# Patient Record
Sex: MALE | Race: ASIAN | URBAN - METROPOLITAN AREA
[De-identification: names, ages, dates, MRNs, and addresses within clinical notes are randomized per-mention and may not be internally consistent; named-entity substitution may affect disease eponyms.]

---

## 2017-09-08 ENCOUNTER — HOSPITAL ENCOUNTER (EMERGENCY)
Facility: HOSPITAL | Age: 57
Discharge: HOME | End: 2017-09-08
Attending: EMERGENCY MEDICINE
Payer: COMMERCIAL

## 2017-09-08 VITALS
WEIGHT: 172 LBS | SYSTOLIC BLOOD PRESSURE: 156 MMHG | TEMPERATURE: 98 F | HEART RATE: 88 BPM | DIASTOLIC BLOOD PRESSURE: 84 MMHG | RESPIRATION RATE: 18 BRPM

## 2017-09-08 DIAGNOSIS — H81.10 BENIGN PAROXYSMAL POSITIONAL VERTIGO, UNSPECIFIED LATERALITY: Primary | ICD-10-CM

## 2017-09-08 PROCEDURE — 99283 EMERGENCY DEPT VISIT LOW MDM: CPT

## 2017-09-08 RX ORDER — SIMVASTATIN 10 MG
10 TABLET ORAL NIGHTLY
COMMUNITY
End: 2021-06-04

## 2017-09-08 RX ORDER — MECLIZINE HYDROCHLORIDE 25 MG/1
TABLET ORAL 3 TIMES DAILY PRN
Qty: 30 TABLET | Refills: 1 | Status: SHIPPED | OUTPATIENT
Start: 2017-09-08 | End: 2019-09-17

## 2017-09-08 RX ORDER — BENAZEPRIL HYDROCHLORIDE 20 MG/1
20 TABLET ORAL DAILY
COMMUNITY
End: 2021-06-04

## 2017-09-08 RX ORDER — MECLIZINE HYDROCHLORIDE 25 MG/1
25 TABLET ORAL ONCE
Status: COMPLETED | OUTPATIENT
Start: 2017-09-08 | End: 2017-09-08

## 2017-09-08 RX ORDER — ONDANSETRON 4 MG/1
4 TABLET, ORALLY DISINTEGRATING ORAL EVERY 4 HOURS PRN
Qty: 10 TABLET | Refills: 0 | Status: SHIPPED | OUTPATIENT
Start: 2017-09-08 | End: 2017-09-15

## 2017-09-08 RX ORDER — ONDANSETRON 4 MG/1
4 TABLET, ORALLY DISINTEGRATING ORAL ONCE
Status: COMPLETED | OUTPATIENT
Start: 2017-09-08 | End: 2017-09-08

## 2017-09-08 RX ORDER — LANSOPRAZOLE 30 MG/1
30 CAPSULE, DELAYED RELEASE ORAL
COMMUNITY

## 2017-09-08 NOTE — ED PROVIDER NOTES
Patient Seen in: [facility] Emergency Department    History   Patient presents with:  Nausea/Vomiting/Diarrhea (gastrointestinal)  Dizziness (neurologic)    Stated Complaint: vomiting/dizziness    HPI    Patient was feeling fine today.   He ate a lar masses, no organomegaly, no rebound, no guarding   Extremities: No cyanosis, no clubbing, no edema   Musculoskeletal: No tenderness, swelling, deformity   Skin: No significant lesions   Neuro: Grossly intact to patient's baseline, cranial nerves II through

## 2017-09-08 NOTE — ED NOTES
Pt denies any nausea or pain at this time pt states he is still dizzy  With movement of his head otherwise ok po challenge given and tolerated

## 2017-12-27 ENCOUNTER — APPOINTMENT (OUTPATIENT)
Dept: GENERAL RADIOLOGY | Facility: HOSPITAL | Age: 57
End: 2017-12-27
Attending: EMERGENCY MEDICINE
Payer: COMMERCIAL

## 2017-12-27 ENCOUNTER — HOSPITAL ENCOUNTER (EMERGENCY)
Facility: HOSPITAL | Age: 57
Discharge: HOME | End: 2017-12-27
Attending: EMERGENCY MEDICINE
Payer: COMMERCIAL

## 2017-12-27 VITALS
SYSTOLIC BLOOD PRESSURE: 128 MMHG | DIASTOLIC BLOOD PRESSURE: 83 MMHG | RESPIRATION RATE: 16 BRPM | OXYGEN SATURATION: 96 % | TEMPERATURE: 99 F | WEIGHT: 172 LBS | HEART RATE: 88 BPM

## 2017-12-27 DIAGNOSIS — J06.9 UPPER RESPIRATORY TRACT INFECTION, UNSPECIFIED TYPE: Primary | ICD-10-CM

## 2017-12-27 DIAGNOSIS — J02.9 VIRAL PHARYNGITIS: ICD-10-CM

## 2017-12-27 PROCEDURE — 87081 CULTURE SCREEN ONLY: CPT | Performed by: EMERGENCY MEDICINE

## 2017-12-27 PROCEDURE — 99283 EMERGENCY DEPT VISIT LOW MDM: CPT

## 2017-12-27 PROCEDURE — 71020 XR CHEST PA + LAT CHEST (CPT=71020): CPT | Performed by: EMERGENCY MEDICINE

## 2017-12-27 PROCEDURE — 87430 STREP A AG IA: CPT | Performed by: EMERGENCY MEDICINE

## 2017-12-27 RX ORDER — OSELTAMIVIR PHOSPHATE 75 MG/1
75 CAPSULE ORAL 2 TIMES DAILY
Qty: 10 CAPSULE | Refills: 0 | Status: SHIPPED | OUTPATIENT
Start: 2017-12-27 | End: 2018-01-01

## 2017-12-27 RX ORDER — FENOFIBRATE 145 MG/1
145 TABLET, COATED ORAL DAILY
COMMUNITY

## 2017-12-27 NOTE — ED NOTES
PT LEFT ANGRY THAT WE CAN'T FIX HIS DRY SCRATCHY THROAT THAT HAS KEPT HIM FROM SLEEPING ALL NIGHT. I TOLD HIM TO TAKE TYLENOL OR MOTRIN FOR PAIN AND HE SAID HE DOESN'T HAVE PAIN.

## 2017-12-27 NOTE — ED PROVIDER NOTES
Patient Seen in: [facility] Emergency Department    History   Patient presents with:  Sore Throat    Stated Complaint: sore throat    HPI    Patient is a 61-year-old male presents emergency room with history of sore throat and cough which is been on focal tenderness to palpation appreciated. There is no JVD. No meningeal signs or nuchal rigidity appreciated. No stridor. LUNGS: Clear to auscultation bilaterally with no wheeze. There is good equal air entry bilaterally. HEART: Regular rate and rhythm. discharged home at this time.             Disposition and Plan     Clinical Impression:  Upper respiratory tract infection, unspecified type  (primary encounter diagnosis)  Viral pharyngitis    Disposition:  Discharge  12/27/2017  6:35 am    Follow-up:  Opa

## 2017-12-27 NOTE — ED INITIAL ASSESSMENT (HPI)
Patient is a 60 yo male with c/o sore throat and cough over the last 24 hours. Patient is afebrile. Patient states that cough is non-productive.

## 2019-09-17 ENCOUNTER — OFFICE VISIT (OUTPATIENT)
Dept: SURGERY | Facility: CLINIC | Age: 59
End: 2019-09-17
Payer: COMMERCIAL

## 2019-09-17 VITALS
HEIGHT: 70 IN | DIASTOLIC BLOOD PRESSURE: 72 MMHG | WEIGHT: 172 LBS | SYSTOLIC BLOOD PRESSURE: 138 MMHG | TEMPERATURE: 98 F | BODY MASS INDEX: 24.62 KG/M2

## 2019-09-17 DIAGNOSIS — K64.2 GRADE III INTERNAL HEMORRHOIDS: Primary | ICD-10-CM

## 2019-09-17 DIAGNOSIS — Z86.010 HX OF COLONIC POLYPS: ICD-10-CM

## 2019-09-17 PROBLEM — Z86.0100 HX OF COLONIC POLYPS: Status: ACTIVE | Noted: 2019-09-17

## 2019-09-17 PROCEDURE — 99203 OFFICE O/P NEW LOW 30 MIN: CPT | Performed by: COLON & RECTAL SURGERY

## 2019-09-17 PROCEDURE — 46600 DIAGNOSTIC ANOSCOPY SPX: CPT | Performed by: COLON & RECTAL SURGERY

## 2019-09-17 RX ORDER — POLYETHYLENE GLYCOL 3350, SODIUM CHLORIDE, SODIUM BICARBONATE, POTASSIUM CHLORIDE 420; 11.2; 5.72; 1.48 G/4L; G/4L; G/4L; G/4L
POWDER, FOR SOLUTION ORAL
Qty: 1 BOTTLE | Refills: 0 | Status: SHIPPED | OUTPATIENT
Start: 2019-09-17 | End: 2021-06-04

## 2019-09-17 NOTE — H&P
New Patient Visit Note       Active Problems      1. Grade III internal hemorrhoids    2. Hx of colonic polyps        Chief Complaint   Patient presents with:  Hemorrhoids: pt here for hemorrhoid consult, pt would like banding tx.        History of Present making. Donita Mark PA-C    I agree with the note as scribed by the PA  I performed my own physical exam and obtained the history as detailed above.   I have made all appropriate changes in documentation as necessary  I attest to the accuracy of thi Oral Tab, Take 20 mg by mouth daily. , Disp: , Rfl:   •  simvastatin 10 MG Oral Tab, Take 10 mg by mouth nightly., Disp: , Rfl:   •  lansoprazole 30 MG Oral Capsule Delayed Release, Take 30 mg by mouth every morning before breakfast., Disp: , Rfl:       Rev Abdominal: Soft. Normal appearance and bowel sounds are normal. He exhibits no distension, no fluid wave, no ascites, no pulsatile midline mass and no mass. There is no splenomegaly or hepatomegaly. There is no tenderness.  There is no rigidity, no reboun Nursing note and vitals reviewed. Assessment   Grade III internal hemorrhoids  (primary encounter diagnosis)  Hx of colonic polyps      Plan   Nonah Pencil presents today in consultation of Dr. Miguel Steele for possible hemorrhoids.     The patient sta of the rectum including anoscopy reveals no external hemorrhoids or fissures. There are prolapsing grade 3 internal hemorrhoids circumferentially. There are no palpable masses. Prostate is of normal size.   There is no evidence of any Crohn's disease or

## 2019-09-17 NOTE — PROCEDURES
Pre op diagnosis: Internal Hemorrhoids    Post op diagnosis: Same    Procedure: Anoscopy with O-ring Rubber Band Ligation of Internal Hemorrhoids    Surgeon: Erika Mendez MD    History of present illness:    This patient has internal hemorrhoids that are s

## 2019-09-17 NOTE — PATIENT INSTRUCTIONS
Jhoana Sweeney presents today in consultation of Dr. Vladislav Wood for possible hemorrhoids. The patient states that for multiple years now he has been suffering from hemorrhoids.   He states that every time he has a bowel movement he feels the hemorrhoids prolap There are no palpable masses. Prostate is of normal size. There is no evidence of any Crohn's disease or proctitis. The patient does have grade 3 internal hemorrhoids. I discussed with the patient that I recommend rubberbanding's at this time.   The p

## 2019-09-27 PROBLEM — M65.342 TRIGGER FINGER, LEFT RING FINGER: Status: ACTIVE | Noted: 2019-09-27

## 2019-10-24 ENCOUNTER — PATIENT OUTREACH (OUTPATIENT)
Dept: SURGERY | Facility: CLINIC | Age: 59
End: 2019-10-24

## 2021-01-07 PROCEDURE — 88305 TISSUE EXAM BY PATHOLOGIST: CPT | Performed by: UROLOGY

## 2021-01-07 PROCEDURE — 88344 IMHCHEM/IMCYTCHM EA MLT ANTB: CPT | Performed by: UROLOGY

## 2021-04-08 PROBLEM — H60.543 ECZEMA OF EXTERNAL EAR, BILATERAL: Status: ACTIVE | Noted: 2021-04-08

## 2021-04-08 PROBLEM — H93.299 ABNORMAL AUDITORY PERCEPTION, UNSPECIFIED LATERALITY: Status: ACTIVE | Noted: 2021-04-08

## 2021-06-04 ENCOUNTER — OFFICE VISIT (OUTPATIENT)
Dept: FAMILY MEDICINE CLINIC | Facility: CLINIC | Age: 61
End: 2021-06-04
Payer: COMMERCIAL

## 2021-06-04 ENCOUNTER — LAB ENCOUNTER (OUTPATIENT)
Dept: LAB | Age: 61
End: 2021-06-04
Attending: FAMILY MEDICINE
Payer: COMMERCIAL

## 2021-06-04 VITALS
HEART RATE: 66 BPM | RESPIRATION RATE: 20 BRPM | HEIGHT: 69 IN | OXYGEN SATURATION: 98 % | DIASTOLIC BLOOD PRESSURE: 80 MMHG | SYSTOLIC BLOOD PRESSURE: 144 MMHG | WEIGHT: 171 LBS | BODY MASS INDEX: 25.33 KG/M2 | TEMPERATURE: 98 F

## 2021-06-04 DIAGNOSIS — E78.2 ELEVATED TRIGLYCERIDES WITH HIGH CHOLESTEROL: ICD-10-CM

## 2021-06-04 DIAGNOSIS — R97.20 ELEVATED PSA: ICD-10-CM

## 2021-06-04 DIAGNOSIS — L20.9 ATOPIC DERMATITIS, UNSPECIFIED TYPE: ICD-10-CM

## 2021-06-04 DIAGNOSIS — L20.9 ATOPIC DERMATITIS, UNSPECIFIED TYPE: Primary | ICD-10-CM

## 2021-06-04 PROCEDURE — 86003 ALLG SPEC IGE CRUDE XTRC EA: CPT

## 2021-06-04 PROCEDURE — 3077F SYST BP >= 140 MM HG: CPT | Performed by: FAMILY MEDICINE

## 2021-06-04 PROCEDURE — 86140 C-REACTIVE PROTEIN: CPT

## 2021-06-04 PROCEDURE — 85652 RBC SED RATE AUTOMATED: CPT

## 2021-06-04 PROCEDURE — 99204 OFFICE O/P NEW MOD 45 MIN: CPT | Performed by: FAMILY MEDICINE

## 2021-06-04 PROCEDURE — 3008F BODY MASS INDEX DOCD: CPT | Performed by: FAMILY MEDICINE

## 2021-06-04 PROCEDURE — 3079F DIAST BP 80-89 MM HG: CPT | Performed by: FAMILY MEDICINE

## 2021-06-04 PROCEDURE — 36415 COLL VENOUS BLD VENIPUNCTURE: CPT

## 2021-06-04 PROCEDURE — 82785 ASSAY OF IGE: CPT

## 2021-06-04 PROCEDURE — 84153 ASSAY OF PSA TOTAL: CPT

## 2021-06-04 RX ORDER — AMLODIPINE BESYLATE AND BENAZEPRIL HYDROCHLORIDE 10; 40 MG/1; MG/1
CAPSULE ORAL
COMMUNITY
Start: 2020-09-21

## 2021-06-04 RX ORDER — ATORVASTATIN CALCIUM 40 MG/1
TABLET, FILM COATED ORAL
COMMUNITY
Start: 2021-04-05

## 2021-06-04 RX ORDER — MECLIZINE HYDROCHLORIDE 25 MG/1
TABLET ORAL
COMMUNITY
Start: 2021-02-18

## 2021-06-04 RX ORDER — ICOSAPENT ETHYL 1000 MG/1
2 CAPSULE ORAL 2 TIMES DAILY
Qty: 120 CAPSULE | Refills: 11 | Status: SHIPPED | OUTPATIENT
Start: 2021-06-04

## 2021-06-04 RX ORDER — TAMSULOSIN HYDROCHLORIDE 0.4 MG/1
CAPSULE ORAL DAILY
COMMUNITY

## 2021-06-04 NOTE — PROGRESS NOTES
HPI/Subjective:   Patient ID: Topher Mason is a 56year old male. Rash  This is a chronic problem. Episode onset: 1 year ago. The problem has been waxing and waning since onset. The rash is diffuse. The rash is characterized by itchiness and redness.  He needed 45 g 2   • Fenofibrate 145 MG Oral Tab Take 145 mg by mouth daily.      • lansoprazole 30 MG Oral Capsule Delayed Release Take 30 mg by mouth every morning before breakfast.       Allergies:No Known Allergies    Objective:   Physical Exam  Vitals rev 11      Orders Placed This Encounter      Adult Food Allergy Prof [E]      Allergens, Zone 8 [E]      Sed Rate, Gaudencioren (Automated) [E]      C-Reactive Protein [E]      Meds This Visit:  Requested Prescriptions     Signed Prescriptions Disp Refills   •

## 2022-04-20 ENCOUNTER — OFFICE VISIT (OUTPATIENT)
Dept: SURGERY | Facility: CLINIC | Age: 62
End: 2022-04-20
Payer: COMMERCIAL

## 2022-04-20 VITALS
HEART RATE: 48 BPM | SYSTOLIC BLOOD PRESSURE: 162 MMHG | WEIGHT: 160.63 LBS | HEIGHT: 70 IN | DIASTOLIC BLOOD PRESSURE: 75 MMHG | TEMPERATURE: 97 F | BODY MASS INDEX: 23 KG/M2

## 2022-04-20 DIAGNOSIS — K40.20 NON-RECURRENT BILATERAL INGUINAL HERNIA WITHOUT OBSTRUCTION OR GANGRENE: Primary | ICD-10-CM

## 2022-04-20 PROCEDURE — 3008F BODY MASS INDEX DOCD: CPT | Performed by: SURGERY

## 2022-04-20 PROCEDURE — 3077F SYST BP >= 140 MM HG: CPT | Performed by: SURGERY

## 2022-04-20 PROCEDURE — 99214 OFFICE O/P EST MOD 30 MIN: CPT | Performed by: SURGERY

## 2022-04-20 PROCEDURE — 3078F DIAST BP <80 MM HG: CPT | Performed by: SURGERY

## 2022-04-22 ENCOUNTER — TELEPHONE (OUTPATIENT)
Dept: SURGERY | Facility: CLINIC | Age: 62
End: 2022-04-22

## 2022-04-29 ENCOUNTER — EKG ENCOUNTER (OUTPATIENT)
Dept: LAB | Facility: HOSPITAL | Age: 62
End: 2022-04-29
Attending: SURGERY
Payer: COMMERCIAL

## 2022-04-29 ENCOUNTER — LAB ENCOUNTER (OUTPATIENT)
Dept: LAB | Facility: HOSPITAL | Age: 62
End: 2022-04-29
Attending: SURGERY
Payer: COMMERCIAL

## 2022-04-29 ENCOUNTER — LABORATORY ENCOUNTER (OUTPATIENT)
Dept: LAB | Facility: HOSPITAL | Age: 62
End: 2022-04-29
Attending: SURGERY
Payer: COMMERCIAL

## 2022-04-29 DIAGNOSIS — Z01.812 ENCOUNTER FOR PREOPERATIVE SCREENING LABORATORY TESTING FOR COVID-19 VIRUS: ICD-10-CM

## 2022-04-29 DIAGNOSIS — Z20.822 ENCOUNTER FOR PREOPERATIVE SCREENING LABORATORY TESTING FOR COVID-19 VIRUS: ICD-10-CM

## 2022-04-29 LAB
ANION GAP SERPL CALC-SCNC: 6 MMOL/L (ref 0–18)
ATRIAL RATE: 54 BPM
BUN BLD-MCNC: 9 MG/DL (ref 7–18)
CALCIUM BLD-MCNC: 9.5 MG/DL (ref 8.5–10.1)
CHLORIDE SERPL-SCNC: 107 MMOL/L (ref 98–112)
CO2 SERPL-SCNC: 26 MMOL/L (ref 21–32)
CREAT BLD-MCNC: 0.93 MG/DL
FASTING STATUS PATIENT QL REPORTED: YES
GLUCOSE BLD-MCNC: 111 MG/DL (ref 70–99)
OSMOLALITY SERPL CALC.SUM OF ELEC: 287 MOSM/KG (ref 275–295)
P AXIS: 57 DEGREES
P-R INTERVAL: 132 MS
POTASSIUM SERPL-SCNC: 4.1 MMOL/L (ref 3.5–5.1)
Q-T INTERVAL: 442 MS
QRS DURATION: 92 MS
QTC CALCULATION (BEZET): 419 MS
R AXIS: 4 DEGREES
SODIUM SERPL-SCNC: 139 MMOL/L (ref 136–145)
T AXIS: 46 DEGREES
VENTRICULAR RATE: 54 BPM

## 2022-04-29 PROCEDURE — 93010 ELECTROCARDIOGRAM REPORT: CPT | Performed by: INTERNAL MEDICINE

## 2022-04-29 PROCEDURE — 80048 BASIC METABOLIC PNL TOTAL CA: CPT

## 2022-04-29 PROCEDURE — 36415 COLL VENOUS BLD VENIPUNCTURE: CPT

## 2022-04-29 PROCEDURE — 93005 ELECTROCARDIOGRAM TRACING: CPT

## 2022-04-30 LAB — SARS-COV-2 RNA RESP QL NAA+PROBE: NOT DETECTED

## 2022-05-02 ENCOUNTER — ANESTHESIA EVENT (OUTPATIENT)
Dept: SURGERY | Facility: HOSPITAL | Age: 62
End: 2022-05-02
Payer: COMMERCIAL

## 2022-05-02 ENCOUNTER — HOSPITAL ENCOUNTER (OUTPATIENT)
Facility: HOSPITAL | Age: 62
Setting detail: HOSPITAL OUTPATIENT SURGERY
Discharge: HOME | End: 2022-05-02
Attending: SURGERY | Admitting: SURGERY
Payer: COMMERCIAL

## 2022-05-02 ENCOUNTER — ANESTHESIA (OUTPATIENT)
Dept: SURGERY | Facility: HOSPITAL | Age: 62
End: 2022-05-02
Payer: COMMERCIAL

## 2022-05-02 VITALS
DIASTOLIC BLOOD PRESSURE: 67 MMHG | HEIGHT: 70 IN | HEART RATE: 54 BPM | TEMPERATURE: 98 F | WEIGHT: 158.94 LBS | OXYGEN SATURATION: 97 % | SYSTOLIC BLOOD PRESSURE: 132 MMHG | BODY MASS INDEX: 22.75 KG/M2 | RESPIRATION RATE: 16 BRPM

## 2022-05-02 DIAGNOSIS — K40.20 NON-RECURRENT BILATERAL INGUINAL HERNIA WITHOUT OBSTRUCTION OR GANGRENE: ICD-10-CM

## 2022-05-02 DIAGNOSIS — Z01.812 ENCOUNTER FOR PREOPERATIVE SCREENING LABORATORY TESTING FOR COVID-19 VIRUS: Primary | ICD-10-CM

## 2022-05-02 DIAGNOSIS — Z20.822 ENCOUNTER FOR PREOPERATIVE SCREENING LABORATORY TESTING FOR COVID-19 VIRUS: Primary | ICD-10-CM

## 2022-05-02 PROCEDURE — 0YUA4JZ SUPPLEMENT BILATERAL INGUINAL REGION WITH SYNTHETIC SUBSTITUTE, PERCUTANEOUS ENDOSCOPIC APPROACH: ICD-10-PCS | Performed by: SURGERY

## 2022-05-02 DEVICE — BARD MESH
Type: IMPLANTABLE DEVICE | Site: INGUINAL | Status: FUNCTIONAL
Brand: BARD MESH

## 2022-05-02 RX ORDER — MEPERIDINE HYDROCHLORIDE 25 MG/ML
12.5 INJECTION INTRAMUSCULAR; INTRAVENOUS; SUBCUTANEOUS AS NEEDED
Status: DISCONTINUED | OUTPATIENT
Start: 2022-05-02 | End: 2022-05-02

## 2022-05-02 RX ORDER — METOCLOPRAMIDE HYDROCHLORIDE 5 MG/ML
10 INJECTION INTRAMUSCULAR; INTRAVENOUS AS NEEDED
Status: DISCONTINUED | OUTPATIENT
Start: 2022-05-02 | End: 2022-05-02

## 2022-05-02 RX ORDER — ACETAMINOPHEN 500 MG
1000 TABLET ORAL ONCE
Status: DISCONTINUED | OUTPATIENT
Start: 2022-05-02 | End: 2022-05-02 | Stop reason: HOSPADM

## 2022-05-02 RX ORDER — HEPARIN SODIUM 5000 [USP'U]/ML
INJECTION, SOLUTION INTRAVENOUS; SUBCUTANEOUS
Status: COMPLETED
Start: 2022-05-02 | End: 2022-05-02

## 2022-05-02 RX ORDER — SODIUM CHLORIDE, SODIUM LACTATE, POTASSIUM CHLORIDE, CALCIUM CHLORIDE 600; 310; 30; 20 MG/100ML; MG/100ML; MG/100ML; MG/100ML
INJECTION, SOLUTION INTRAVENOUS CONTINUOUS
Status: DISCONTINUED | OUTPATIENT
Start: 2022-05-02 | End: 2022-05-02

## 2022-05-02 RX ORDER — ONDANSETRON 2 MG/ML
4 INJECTION INTRAMUSCULAR; INTRAVENOUS AS NEEDED
Status: DISCONTINUED | OUTPATIENT
Start: 2022-05-02 | End: 2022-05-02

## 2022-05-02 RX ORDER — HYDROMORPHONE HYDROCHLORIDE 1 MG/ML
0.4 INJECTION, SOLUTION INTRAMUSCULAR; INTRAVENOUS; SUBCUTANEOUS EVERY 5 MIN PRN
Status: DISCONTINUED | OUTPATIENT
Start: 2022-05-02 | End: 2022-05-02

## 2022-05-02 RX ORDER — HYDROCODONE BITARTRATE AND ACETAMINOPHEN 5; 325 MG/1; MG/1
2 TABLET ORAL AS NEEDED
Status: DISCONTINUED | OUTPATIENT
Start: 2022-05-02 | End: 2022-05-02

## 2022-05-02 RX ORDER — KETOROLAC TROMETHAMINE 30 MG/ML
INJECTION, SOLUTION INTRAMUSCULAR; INTRAVENOUS
Status: COMPLETED
Start: 2022-05-02 | End: 2022-05-02

## 2022-05-02 RX ORDER — HEPARIN SODIUM 5000 [USP'U]/ML
5000 INJECTION, SOLUTION INTRAVENOUS; SUBCUTANEOUS ONCE
Status: COMPLETED | OUTPATIENT
Start: 2022-05-02 | End: 2022-05-02

## 2022-05-02 RX ORDER — LIDOCAINE HYDROCHLORIDE 10 MG/ML
INJECTION, SOLUTION EPIDURAL; INFILTRATION; INTRACAUDAL; PERINEURAL AS NEEDED
Status: DISCONTINUED | OUTPATIENT
Start: 2022-05-02 | End: 2022-05-02 | Stop reason: SURG

## 2022-05-02 RX ORDER — CEFAZOLIN SODIUM/WATER 2 G/20 ML
SYRINGE (ML) INTRAVENOUS
Status: DISCONTINUED
Start: 2022-05-02 | End: 2022-05-02

## 2022-05-02 RX ORDER — KETOROLAC TROMETHAMINE 30 MG/ML
30 INJECTION, SOLUTION INTRAMUSCULAR; INTRAVENOUS ONCE
Status: COMPLETED | OUTPATIENT
Start: 2022-05-02 | End: 2022-05-02

## 2022-05-02 RX ORDER — DEXAMETHASONE SODIUM PHOSPHATE 4 MG/ML
VIAL (ML) INJECTION AS NEEDED
Status: DISCONTINUED | OUTPATIENT
Start: 2022-05-02 | End: 2022-05-02 | Stop reason: SURG

## 2022-05-02 RX ORDER — HYDROCODONE BITARTRATE AND ACETAMINOPHEN 5; 325 MG/1; MG/1
1 TABLET ORAL EVERY 6 HOURS PRN
Qty: 20 TABLET | Refills: 0 | Status: SHIPPED | OUTPATIENT
Start: 2022-05-02 | End: 2022-05-09

## 2022-05-02 RX ORDER — NALOXONE HYDROCHLORIDE 0.4 MG/ML
80 INJECTION, SOLUTION INTRAMUSCULAR; INTRAVENOUS; SUBCUTANEOUS AS NEEDED
Status: DISCONTINUED | OUTPATIENT
Start: 2022-05-02 | End: 2022-05-02

## 2022-05-02 RX ORDER — BUPIVACAINE HYDROCHLORIDE AND EPINEPHRINE 5; 5 MG/ML; UG/ML
INJECTION, SOLUTION EPIDURAL; INTRACAUDAL; PERINEURAL AS NEEDED
Status: DISCONTINUED | OUTPATIENT
Start: 2022-05-02 | End: 2022-05-02 | Stop reason: HOSPADM

## 2022-05-02 RX ORDER — NEOSTIGMINE METHYLSULFATE 1 MG/ML
INJECTION INTRAVENOUS AS NEEDED
Status: DISCONTINUED | OUTPATIENT
Start: 2022-05-02 | End: 2022-05-02 | Stop reason: SURG

## 2022-05-02 RX ORDER — GLYCOPYRROLATE 0.2 MG/ML
INJECTION, SOLUTION INTRAMUSCULAR; INTRAVENOUS AS NEEDED
Status: DISCONTINUED | OUTPATIENT
Start: 2022-05-02 | End: 2022-05-02 | Stop reason: SURG

## 2022-05-02 RX ORDER — CEFAZOLIN SODIUM/WATER 2 G/20 ML
2 SYRINGE (ML) INTRAVENOUS ONCE
Status: COMPLETED | OUTPATIENT
Start: 2022-05-02 | End: 2022-05-02

## 2022-05-02 RX ORDER — ROCURONIUM BROMIDE 10 MG/ML
INJECTION, SOLUTION INTRAVENOUS AS NEEDED
Status: DISCONTINUED | OUTPATIENT
Start: 2022-05-02 | End: 2022-05-02 | Stop reason: SURG

## 2022-05-02 RX ORDER — HYDROCODONE BITARTRATE AND ACETAMINOPHEN 5; 325 MG/1; MG/1
1 TABLET ORAL AS NEEDED
Status: DISCONTINUED | OUTPATIENT
Start: 2022-05-02 | End: 2022-05-02

## 2022-05-02 RX ORDER — HYDROMORPHONE HYDROCHLORIDE 1 MG/ML
INJECTION, SOLUTION INTRAMUSCULAR; INTRAVENOUS; SUBCUTANEOUS
Status: COMPLETED
Start: 2022-05-02 | End: 2022-05-02

## 2022-05-02 RX ORDER — ONDANSETRON 2 MG/ML
INJECTION INTRAMUSCULAR; INTRAVENOUS AS NEEDED
Status: DISCONTINUED | OUTPATIENT
Start: 2022-05-02 | End: 2022-05-02 | Stop reason: SURG

## 2022-05-02 RX ADMIN — ROCURONIUM BROMIDE 40 MG: 10 INJECTION, SOLUTION INTRAVENOUS at 10:36:00

## 2022-05-02 RX ADMIN — GLYCOPYRROLATE 0.6 MG: 0.2 INJECTION, SOLUTION INTRAMUSCULAR; INTRAVENOUS at 11:38:00

## 2022-05-02 RX ADMIN — SODIUM CHLORIDE, SODIUM LACTATE, POTASSIUM CHLORIDE, CALCIUM CHLORIDE: 600; 310; 30; 20 INJECTION, SOLUTION INTRAVENOUS at 11:38:00

## 2022-05-02 RX ADMIN — SODIUM CHLORIDE, SODIUM LACTATE, POTASSIUM CHLORIDE, CALCIUM CHLORIDE: 600; 310; 30; 20 INJECTION, SOLUTION INTRAVENOUS at 11:53:00

## 2022-05-02 RX ADMIN — LIDOCAINE HYDROCHLORIDE 50 MG: 10 INJECTION, SOLUTION EPIDURAL; INFILTRATION; INTRACAUDAL; PERINEURAL at 10:36:00

## 2022-05-02 RX ADMIN — NEOSTIGMINE METHYLSULFATE 3 MG: 1 INJECTION INTRAVENOUS at 11:38:00

## 2022-05-02 RX ADMIN — DEXAMETHASONE SODIUM PHOSPHATE 8 MG: 4 MG/ML VIAL (ML) INJECTION at 10:50:00

## 2022-05-02 RX ADMIN — ONDANSETRON 4 MG: 2 INJECTION INTRAMUSCULAR; INTRAVENOUS at 10:50:00

## 2022-05-02 RX ADMIN — CEFAZOLIN SODIUM/WATER 2 G: 2 G/20 ML SYRINGE (ML) INTRAVENOUS at 10:42:00

## 2022-05-02 RX ADMIN — SODIUM CHLORIDE, SODIUM LACTATE, POTASSIUM CHLORIDE, CALCIUM CHLORIDE: 600; 310; 30; 20 INJECTION, SOLUTION INTRAVENOUS at 10:32:00

## 2022-05-02 NOTE — OPERATIVE REPORT
[facility]  Op Note    Escobar Erwin Location: OR   CSN [id number] MRN [id number]   Admission Date 5/2/2022 Operation Date 5/2/2022   Attending Physician Michael Nunes MD Operating Physician Mirtha Huertas MD   DATE OF OPERATION:  5/2/2022  PREOPERATIVE DIAGNOSIS:  Bilateral inguinal hernia. POSTOPERATIVE DIAGNOSIS:  Bilateral inguinal hernia. PROCEDURE PERFORMED: Laparoscopic bilateral inguinal hernia repair with mesh  SURGEON:  Efraín Meade M.D.  ASSISTANT: Jennifer Muñoz PA-C (Ms. Baird assisted with retraction of the peritoneum and placement of the mesh.)  ANESTHESIA: General.   SPECIMEN: None. BLOOD LOSS: 5 mL. COMPLICATIONS: None. INDICATIONS FOR PROCEDURE: The patient is a 64-year-old gentleman who noticed a bulge in his right groin. Physical exam not only confirmed the presence of a right inguinal hernia, but he was found to have a small left inguinal hernia as well. The patient was offered laparoscopic bilateral inguinal hernia repair with mesh. The risks, benefits, and alternatives of this were discussed in detail with the patient. Risks included, but were not limited to, recurrence of the hernia, bleeding, wound infection, and injury to the cord vessels and spermatic cord itself. The patient was agreeable to proceed with the operation. OPERATIVE TECHNIQUE: After informed consent was obtained, patient was taken to the operating room and placed in supine position. General anesthesia was induced, and a Sosa catheter was placed. The abdomen was then prepped and draped in the usual sterile fashion. The umbilicus was inverted, and a curvilinear incision was made superior to it with an 11 blade scalpel. The Veress needle was passed into the abdomen, and pneumoperitoneum was instituted to a pressure of 15 without difficulty. The 11-mm trocar was then passed through this incision site.   The abdomen was inspected and found to be grossly normal except for the inguinal hernia. Under direct vision, bilateral 5-mm ports were then placed. The patient was placed head down. Starting on the patient's left side, the peritoneum was grasped and incised using scissors with cautery. Blunt dissection was then used to dissect out the pubic tubercle as well as the lateral attachments. The hernia was then reduced into the abdomen. The Marlex mesh was soaked in saline and passed on to the field. This was then trimmed to size and passed down the umbilical port. This was secured in place using the Titanium tacker. Attention was turned to the left inguinal hernia. In the same fashion, the peritoneum was grasped and incised using scissors with cautery. Blunt dissection was then used to dissect out the pubic tubercle as well as the lateral attachments. The hernia was then reduced into the abdomen. The Marlex mesh was soaked in saline and passed on to the field. This was then trimmed to size and passed down the umbilical port. This was secured in place using the Titanium tacker. The peritoneum was then reapproximated bilaterally using the Titanium tacker. The 5-mm ports were withdrawn under direct vision, the pneumoperitoneum was aspirated, and the remaining port withdrawn. The fascia at the umbilical port site was reapproximated with Maxon suture, and the skin at all three incisions was reapproximated with a subcuticular Vicryl suture. Marcaine 0.5% with epinephrine was injected into the incisions to help with postoperative pain control. Steri-Strips and Mastisol were placed across the incisions as well as sterile dressings. The patient tolerated the procedure well, was extubated in the OR, and went to the PACU in good condition.     Martín Ross MD

## 2022-05-02 NOTE — INTERVAL H&P NOTE
Pre-op Diagnosis: Bilateral inguinal hernia without obstruction or gangrene, recurrence not specified [K40.20]    The above referenced H&P was reviewed by Fabio Mi MD on 5/2/2022, the patient was examined and no significant changes have occurred in the patient's condition since the H&P was performed. I discussed with the patient and/or legal representative the potential benefits, risks and side effects of this procedure; the likelihood of the patient achieving goals; and potential problems that might occur during recuperation. I discussed reasonable alternatives to the procedure, including risks, benefits and side effects related to the alternatives and risks related to not receiving this procedure. We will proceed with procedure as planned.

## 2022-05-02 NOTE — ANESTHESIA PROCEDURE NOTES
Airway  Date/Time: 5/2/2022 10:38 AM  Urgency: elective      General Information and Staff    Patient location during procedure: OR  Anesthesiologist: Mohini Daly MD  Performed: anesthesiologist     Indications and Patient Condition  Indications for airway management: anesthesia  Sedation level: deep  Preoxygenated: yes  Patient position: sniffing  Mask difficulty assessment: 1 - vent by mask    Final Airway Details  Final airway type: endotracheal airway      Successful airway: ETT  Cuffed: yes   Successful intubation technique: direct laryngoscopy  Endotracheal tube insertion site: oral  Blade: Alysa  Blade size: #3  ETT size (mm): 7.5    Placement verified by: chest auscultation and capnometry   Measured from: lips  Number of attempts at approach: 1  Number of other approaches attempted: 0

## 2022-05-03 ENCOUNTER — TELEPHONE (OUTPATIENT)
Dept: SURGERY | Facility: CLINIC | Age: 62
End: 2022-05-03

## 2022-05-03 NOTE — TELEPHONE ENCOUNTER
----- Message from [address] [place], [place] [address]ial Way. Chai sent at 5/3/2022  8:11 AM CDT -----  Regarding: Burning Sensation and pain while passing urine  Good Morning Dr,    I am having terrible pain ( 8 on a scale of 10) and burning sensation while passing urine and i am able to pass only few drops each time. Please advise. Thank you    Wilfred  Pt states he I emptying his bladder but it is taking longer and more trips to bathroom. He states his urine looks normal. He denies bladder pain. Explained Karis Fernandez is due to a catheter he had during surgery and should resolve in 24 to 48 hours. Pt to call if no improvement or condition worsens.

## 2022-05-09 ENCOUNTER — OFFICE VISIT (OUTPATIENT)
Dept: SURGERY | Facility: CLINIC | Age: 62
End: 2022-05-09

## 2022-05-09 VITALS
HEIGHT: 70 IN | BODY MASS INDEX: 22.62 KG/M2 | DIASTOLIC BLOOD PRESSURE: 77 MMHG | WEIGHT: 158 LBS | TEMPERATURE: 97 F | SYSTOLIC BLOOD PRESSURE: 145 MMHG | HEART RATE: 61 BPM

## 2022-05-09 DIAGNOSIS — K40.20 NON-RECURRENT BILATERAL INGUINAL HERNIA WITHOUT OBSTRUCTION OR GANGRENE: Primary | ICD-10-CM

## 2022-05-09 PROCEDURE — 3077F SYST BP >= 140 MM HG: CPT | Performed by: SURGERY

## 2022-05-09 PROCEDURE — 99024 POSTOP FOLLOW-UP VISIT: CPT | Performed by: SURGERY

## 2022-05-09 PROCEDURE — 3008F BODY MASS INDEX DOCD: CPT | Performed by: SURGERY

## 2022-05-09 PROCEDURE — 3078F DIAST BP <80 MM HG: CPT | Performed by: SURGERY

## 2022-05-19 ENCOUNTER — OFFICE VISIT (OUTPATIENT)
Dept: SURGERY | Facility: CLINIC | Age: 62
End: 2022-05-19

## 2022-05-19 VITALS — DIASTOLIC BLOOD PRESSURE: 67 MMHG | HEART RATE: 54 BPM | TEMPERATURE: 97 F | SYSTOLIC BLOOD PRESSURE: 132 MMHG

## 2022-05-19 DIAGNOSIS — Z98.890 POST-OPERATIVE STATE: Primary | ICD-10-CM

## 2022-05-19 PROCEDURE — 99024 POSTOP FOLLOW-UP VISIT: CPT | Performed by: STUDENT IN AN ORGANIZED HEALTH CARE EDUCATION/TRAINING PROGRAM

## 2022-05-19 PROCEDURE — 3078F DIAST BP <80 MM HG: CPT | Performed by: STUDENT IN AN ORGANIZED HEALTH CARE EDUCATION/TRAINING PROGRAM

## 2022-05-19 PROCEDURE — 3075F SYST BP GE 130 - 139MM HG: CPT | Performed by: STUDENT IN AN ORGANIZED HEALTH CARE EDUCATION/TRAINING PROGRAM

## 2022-05-19 RX ORDER — MELOXICAM 7.5 MG/1
7.5 TABLET ORAL DAILY
Qty: 30 TABLET | Refills: 0 | Status: SHIPPED | OUTPATIENT
Start: 2022-05-19 | End: 2022-06-18

## 2022-05-23 ENCOUNTER — OFFICE VISIT (OUTPATIENT)
Dept: SURGERY | Facility: CLINIC | Age: 62
End: 2022-05-23

## 2022-05-23 ENCOUNTER — TELEPHONE (OUTPATIENT)
Dept: SURGERY | Facility: CLINIC | Age: 62
End: 2022-05-23

## 2022-05-23 VITALS — HEIGHT: 70 IN | BODY MASS INDEX: 22.76 KG/M2 | WEIGHT: 159 LBS | TEMPERATURE: 97 F

## 2022-05-23 DIAGNOSIS — Z87.19 STATUS POST BILATERAL INGUINAL HERNIA REPAIR: Primary | ICD-10-CM

## 2022-05-23 DIAGNOSIS — Z98.890 STATUS POST BILATERAL INGUINAL HERNIA REPAIR: Primary | ICD-10-CM

## 2022-05-23 DIAGNOSIS — Z98.890 POST-OPERATIVE STATE: ICD-10-CM

## 2022-05-23 PROCEDURE — 3008F BODY MASS INDEX DOCD: CPT

## 2022-05-23 PROCEDURE — 99024 POSTOP FOLLOW-UP VISIT: CPT

## 2022-05-23 NOTE — TELEPHONE ENCOUNTER
Patient to call back if Marcaine procedure in office on 5/23/22 with Dr. Shelbie Valdez resolve pain, if pain improved ok to place an order for Kenalog and schedule an appointment with Dr. Shelbie Valdez or another doctor if Dr. Shelbie Valdez is out of town.

## 2022-06-08 ENCOUNTER — OFFICE VISIT (OUTPATIENT)
Dept: SURGERY | Facility: CLINIC | Age: 62
End: 2022-06-08

## 2022-06-08 VITALS
DIASTOLIC BLOOD PRESSURE: 87 MMHG | BODY MASS INDEX: 22.76 KG/M2 | HEIGHT: 70 IN | WEIGHT: 159 LBS | TEMPERATURE: 98 F | HEART RATE: 64 BPM | SYSTOLIC BLOOD PRESSURE: 158 MMHG

## 2022-06-08 DIAGNOSIS — R10.31 RIGHT GROIN PAIN: Primary | ICD-10-CM

## 2022-06-08 DIAGNOSIS — Z87.19 S/P BILATERAL INGUINAL HERNIA REPAIR: ICD-10-CM

## 2022-06-08 DIAGNOSIS — Z98.890 S/P BILATERAL INGUINAL HERNIA REPAIR: ICD-10-CM

## 2022-06-08 PROCEDURE — 3079F DIAST BP 80-89 MM HG: CPT | Performed by: SURGERY

## 2022-06-08 PROCEDURE — 99024 POSTOP FOLLOW-UP VISIT: CPT | Performed by: SURGERY

## 2022-06-08 PROCEDURE — 3008F BODY MASS INDEX DOCD: CPT | Performed by: SURGERY

## 2022-06-08 PROCEDURE — 3077F SYST BP >= 140 MM HG: CPT | Performed by: SURGERY

## 2022-07-08 ENCOUNTER — HOSPITAL ENCOUNTER (OUTPATIENT)
Dept: ULTRASOUND IMAGING | Age: 62
Discharge: HOME | End: 2022-07-08
Attending: SURGERY
Payer: COMMERCIAL

## 2022-07-08 DIAGNOSIS — Z98.890 S/P BILATERAL INGUINAL HERNIA REPAIR: ICD-10-CM

## 2022-07-08 DIAGNOSIS — Z87.19 S/P BILATERAL INGUINAL HERNIA REPAIR: ICD-10-CM

## 2022-07-08 DIAGNOSIS — R10.31 RIGHT GROIN PAIN: ICD-10-CM

## 2022-07-08 PROCEDURE — 76882 US LMTD JT/FCL EVL NVASC XTR: CPT | Performed by: SURGERY

## 2022-11-21 ENCOUNTER — OFFICE VISIT (OUTPATIENT)
Facility: LOCATION | Age: 62
End: 2022-11-21
Payer: COMMERCIAL

## 2022-11-21 VITALS — HEART RATE: 97 BPM | TEMPERATURE: 98 F

## 2022-11-21 DIAGNOSIS — K57.90 DIVERTICULOSIS: ICD-10-CM

## 2022-11-21 DIAGNOSIS — K64.2 GRADE III INTERNAL HEMORRHOIDS: ICD-10-CM

## 2022-11-21 DIAGNOSIS — K63.5 POLYP OF COLON, UNSPECIFIED PART OF COLON, UNSPECIFIED TYPE: Primary | ICD-10-CM

## 2022-11-21 DIAGNOSIS — N40.0 ENLARGED PROSTATE ON RECTAL EXAMINATION: ICD-10-CM

## 2022-11-21 PROCEDURE — 46600 DIAGNOSTIC ANOSCOPY SPX: CPT | Performed by: COLON & RECTAL SURGERY

## 2022-11-21 PROCEDURE — 99204 OFFICE O/P NEW MOD 45 MIN: CPT | Performed by: COLON & RECTAL SURGERY

## 2022-11-21 RX ORDER — POLYETHYLENE GLYCOL 3350, SODIUM CHLORIDE, SODIUM BICARBONATE, POTASSIUM CHLORIDE 420; 11.2; 5.72; 1.48 G/4L; G/4L; G/4L; G/4L
POWDER, FOR SOLUTION ORAL
Qty: 1 EACH | Refills: 0 | Status: SHIPPED | OUTPATIENT
Start: 2022-11-21

## 2022-11-21 NOTE — PATIENT INSTRUCTIONS
The patient presents for evaluation of internal hemorrhoids and her notes a colonoscopy consult. The patient has had a prior colonoscopy. I performed this patient's most recent colonoscopy on October 1, 2019. He had diverticulosis at that time. He does have a history of colon polyps. The patient denies diarrhea, constipation or alternating between the two. The patient denies having any blood, mucus or dark tarry stools. The patient denies having any narrowing of stools. The patient denies any unintentional weight loss. The patient has occasional abdominal pain. He denies abdominal distention the patient denies fevers, chills, nausea or vomiting. The patient also complains of hemorrhoids. We last saw the patient in 2019 regarding his grade 3 internal hemorrhoids. He was recommended to undergo rubber band treatment at that time, but he never completed this treatment. The patient's hemorrhoids are primarily asymptomatic. He does complain of some perianal pruritus. He denies rectal pain or bleeding. He states his hemorrhoids prolapse with every bowel movement and require manual reduction. The patient denies any first or second-degree family history of colon cancer or colon polyps. The patient denies any first or second-degree family history of uterine cancer. The patient has no significant past medical history. He does not take any blood thinners. The patient has the patient had a bilateral inguinal hernia repair in May of this year with Dr. Gualberto Madrid. He has had no other abdominal operations     Clinical examination of the abdomen reveals it to be soft, nondistended, nontender, bowel sounds are normal activity normal pitch. There  is no rebounding tenderness or guarding. There are no signs of ascites or peritonitis. The liver and spleen are nonpalpable. There are no palpable masses or hernias. The patient has well-healed laparoscopic incision sites.     Clinical exam of the rectum including anoscopy reveals no external hemorrhoids, fissures, fistulae or abscesses. Anoscopy reveals grade 3 internal hemorrhoids in the right anterior, right posterior and left lateral locations. There is no evidence of proctitis or Crohn's disease. Digital rectal exam reveals no palpable masses. He has an enlarged prostate. Basal tone 3/4 and maximum squeeze pressure 3/4. I took the opportunity at today's visit to rubberbanded his right anterior grade 3 internal hemorrhoid. The patient will be scheduled to undergo a colonoscopy. He will also set up 3 additional rubber band treatments of his hemorrhoids at today's visit. All risks, benefits, complications and alternatives to the proposed procedure(s) were fully discussed with the patient. All questions from the patient were answered in detail. A description of the procedure(s) possible outcomes was fully discussed. The patient seemed to understand the conversation and its details. Consent for the procedure(s) was confirmed with the patient.

## 2022-12-06 ENCOUNTER — TELEPHONE (OUTPATIENT)
Facility: LOCATION | Age: 62
End: 2022-12-06

## 2022-12-06 NOTE — TELEPHONE ENCOUNTER
Initiated authorization for Colonoscopy CPT 27798 dx:K63. 5+K57.90 to be done at [facility] with [facility]  Status: APPROVED valid 12/6/22-3/6/22  Authorization #:[id number]

## 2022-12-15 RX ORDER — OMEPRAZOLE 20 MG/1
20 CAPSULE, DELAYED RELEASE ORAL
COMMUNITY

## 2022-12-20 ENCOUNTER — ANESTHESIA (OUTPATIENT)
Dept: ENDOSCOPY | Facility: HOSPITAL | Age: 62
End: 2022-12-20
Payer: COMMERCIAL

## 2022-12-20 ENCOUNTER — HOSPITAL ENCOUNTER (OUTPATIENT)
Facility: HOSPITAL | Age: 62
Setting detail: HOSPITAL OUTPATIENT SURGERY
Discharge: HOME | End: 2022-12-20
Attending: COLON & RECTAL SURGERY | Admitting: COLON & RECTAL SURGERY
Payer: COMMERCIAL

## 2022-12-20 ENCOUNTER — ANESTHESIA EVENT (OUTPATIENT)
Dept: ENDOSCOPY | Facility: HOSPITAL | Age: 62
End: 2022-12-20
Payer: COMMERCIAL

## 2022-12-20 VITALS
BODY MASS INDEX: 21.76 KG/M2 | HEIGHT: 70 IN | RESPIRATION RATE: 16 BRPM | TEMPERATURE: 98 F | HEART RATE: 51 BPM | WEIGHT: 152 LBS | SYSTOLIC BLOOD PRESSURE: 113 MMHG | OXYGEN SATURATION: 99 % | DIASTOLIC BLOOD PRESSURE: 68 MMHG

## 2022-12-20 DIAGNOSIS — K63.5 POLYP OF COLON, UNSPECIFIED PART OF COLON, UNSPECIFIED TYPE: ICD-10-CM

## 2022-12-20 DIAGNOSIS — K57.90 DIVERTICULOSIS: ICD-10-CM

## 2022-12-20 PROCEDURE — 0DJD8ZZ INSPECTION OF LOWER INTESTINAL TRACT, VIA NATURAL OR ARTIFICIAL OPENING ENDOSCOPIC: ICD-10-PCS | Performed by: COLON & RECTAL SURGERY

## 2022-12-20 RX ORDER — NALOXONE HYDROCHLORIDE 0.4 MG/ML
80 INJECTION, SOLUTION INTRAMUSCULAR; INTRAVENOUS; SUBCUTANEOUS AS NEEDED
Status: DISCONTINUED | OUTPATIENT
Start: 2022-12-20 | End: 2022-12-20

## 2022-12-20 RX ORDER — LIDOCAINE HYDROCHLORIDE 10 MG/ML
INJECTION, SOLUTION EPIDURAL; INFILTRATION; INTRACAUDAL; PERINEURAL AS NEEDED
Status: DISCONTINUED | OUTPATIENT
Start: 2022-12-20 | End: 2022-12-20 | Stop reason: SURG

## 2022-12-20 RX ORDER — SODIUM CHLORIDE, SODIUM LACTATE, POTASSIUM CHLORIDE, CALCIUM CHLORIDE 600; 310; 30; 20 MG/100ML; MG/100ML; MG/100ML; MG/100ML
INJECTION, SOLUTION INTRAVENOUS CONTINUOUS
Status: DISCONTINUED | OUTPATIENT
Start: 2022-12-20 | End: 2022-12-20

## 2022-12-20 RX ADMIN — LIDOCAINE HYDROCHLORIDE 50 MG: 10 INJECTION, SOLUTION EPIDURAL; INFILTRATION; INTRACAUDAL; PERINEURAL at 11:25:00

## 2022-12-20 NOTE — DISCHARGE INSTRUCTIONS
Home Care Instructions for Colonoscopy with Sedation    Diet:  - Resume your regular diet   - Start with light meals to minimize bloating.  - Do not drink alcohol today. Medication:  - If you have questions about resuming your normal medications, please contact your Primary Care Physician. Activities:  - Take it easy today. Do not return to work today. - Do not drive today. - Do not operate any machinery today (including kitchen equipment). Colonoscopy:  - You may notice some rectal \"spotting\" (a little blood on the toilet tissue) for a day or two after the exam. This is normal.  - If you experience any rectal bleeding (not spotting), persistent tenderness or sharp severe abdominal pains, oral temperature over 100 degrees Fahrenheit, light-headedness or dizziness, or any other problems, contact your doctor. **If unable to reach your doctor, please go to the [facility] Emergency Room**    - Your referring physician will receive a full report of your examination.  - If you do not hear from your doctor's office within two weeks of your biopsy, please call them for your results.

## 2022-12-20 NOTE — ANESTHESIA POSTPROCEDURE EVALUATION
[address] Patient Status:  Hospital Outpatient Surgery   Age/Gender 57year old male MRN [id number]   Location [address] [address] Attending Leila Sorenson MD   [facility] Day # 0 PCP Samira Iverson DO       Anesthesia Post-op Note    COLONOSCOPY    Procedure Summary     Date: 12/20/22 Room / Location: [facility] [facility] ENDOSCOPY 03 / [facility] ENDOSCOPY    Anesthesia Start: 1118 Anesthesia Stop:     Procedure: COLONOSCOPY Diagnosis:       Polyp of colon, unspecified part of colon, unspecified type      Diverticulosis      (Enlarged Prostate, Diverticulosis)    Surgeons: Leila Sorenson MD Anesthesiologist: Eleno Fang MD    Anesthesia Type: MAC ASA Status: 2          Anesthesia Type: MAC    Vitals Value Taken Time   /60 12/20/22 1143   Temp na 12/20/22 1143   Pulse 53 12/20/22 1141   Resp 16 12/20/22 1143   SpO2 98 % 12/20/22 1141   Vitals shown include unvalidated device data. Patient Location: Endoscopy    Anesthesia Type: MAC    Airway Patency: patent    Postop Pain Control: adequate    Mental Status: mildly sedated but able to meaningfully participate in the post-anesthesia evaluation    Nausea/Vomiting: none    Cardiopulmonary/Hydration status: stable euvolemic    Complications: no apparent anesthesia related complications    Postop vital signs: stable    Dental Exam: Unchanged from Preop    Patient to be discharged from PACU when criteria met.

## 2023-01-09 ENCOUNTER — OFFICE VISIT (OUTPATIENT)
Facility: LOCATION | Age: 63
End: 2023-01-09
Payer: COMMERCIAL

## 2023-01-09 VITALS — HEART RATE: 65 BPM | TEMPERATURE: 97 F

## 2023-01-09 DIAGNOSIS — K64.2 GRADE III INTERNAL HEMORRHOIDS: Primary | ICD-10-CM

## 2023-01-09 PROCEDURE — 46221 LIGATION OF HEMORRHOID(S): CPT | Performed by: COLON & RECTAL SURGERY

## 2023-01-12 NOTE — PATIENT INSTRUCTIONS
At today's office visit we treated right yara-lateral internal hemorrhoid. At today's visit, the patient underwent an uncomplicated application of a rubber band and injection of a 5% phenol solution into the base of the rubberbanded hemorrhoid. The patient tolerated the procedure well. The patient remained stable throughout the entire procedure within my office. The patient was asked to recover in my office for a few minutes prior to leaving for home. The patient should refrain from extreme sports or athletic activity, including heavy lifting. We will see this patient again in 2-3 weeks for further care and treatment.

## 2023-01-12 NOTE — PROCEDURES
Pre op diagnosis: Internal Hemorrhoids    Post op diagnosis: Same    Procedure: Anoscopy with O-ring Rubber Band Ligation of Internal Hemorrhoids    Surgeon: Janie Banks MD    History of present illness: This patient has internal hemorrhoids that are symptomatic    Operative findings: The internal hemorrhoid was successfully ligated in the standard fashion with an O-ring ligator. Operative summary:  The patient was draped and exposed in the usual fashion. A medical assistant was present at all times. External visualization of the perineum and gluteal cleft was performed. Digital exam was performed with a well lubricated examining finger. Diagnostic Anoscopy was performed with lubrication. The anal canal was well visualized. An internal hemorrhoid was identified and well visualized. The internal hemorrhoid was grasped well above the dentate line with the grasper. The internal hemorrhoid was pulled within the O-ring ligator. The O-ring ligator was fired without complication. A 5% phenol solution was injected into the hemorrhoid and at its base. The patient tolerated the procedure and was observed in our office for at least 5 minutes prior to discharge. All findings are listed in the physical exam section of this note.

## 2023-01-31 ENCOUNTER — OFFICE VISIT (OUTPATIENT)
Facility: LOCATION | Age: 63
End: 2023-01-31
Payer: COMMERCIAL

## 2023-01-31 DIAGNOSIS — K64.2 GRADE III INTERNAL HEMORRHOIDS: Primary | ICD-10-CM

## 2023-01-31 PROCEDURE — 46221 LIGATION OF HEMORRHOID(S): CPT | Performed by: COLON & RECTAL SURGERY

## 2023-02-01 NOTE — PROCEDURES
Pre op diagnosis: Internal Hemorrhoids    Post op diagnosis: Same    Procedure: Anoscopy with O-ring Rubber Band Ligation of Internal Hemorrhoids    Surgeon: Argentina Severino MD    History of present illness: This patient has internal hemorrhoids that are symptomatic    Operative findings: The internal hemorrhoid was successfully ligated in the standard fashion with an O-ring ligator. Operative summary:  The patient was draped and exposed in the usual fashion. A medical assistant was present at all times. External visualization of the perineum and gluteal cleft was performed. Digital exam was performed with a well lubricated examining finger. Diagnostic Anoscopy was performed with lubrication. The anal canal was well visualized. An internal hemorrhoid was identified and well visualized. The internal hemorrhoid was grasped well above the dentate line with the grasper. The internal hemorrhoid was pulled within the O-ring ligator. The O-ring ligator was fired without complication. A 5% phenol solution was injected into the hemorrhoid and at its base. The patient tolerated the procedure and was observed in our office for at least 5 minutes prior to discharge. All findings are listed in the physical exam section of this note.

## 2023-02-01 NOTE — PATIENT INSTRUCTIONS
At today's office visit we treated a left lateral internal hemorrhoid. At today's visit, the patient underwent an uncomplicated application of a rubber band and injection of a 5% phenol solution into the base of the rubberbanded hemorrhoid. The patient tolerated the procedure well. The patient remained stable throughout the entire procedure within my office. The patient was asked to recover in my office for a few minutes prior to leaving for home. The patient should refrain from extreme sports or athletic activity, including heavy lifting. We will see this patient again in 2-3 weeks for further care and treatment.

## 2023-02-06 ENCOUNTER — HOSPITAL ENCOUNTER (EMERGENCY)
Facility: HOSPITAL | Age: 63
Discharge: HOME | End: 2023-02-07
Attending: EMERGENCY MEDICINE
Payer: COMMERCIAL

## 2023-02-06 DIAGNOSIS — R33.9 URINARY RETENTION: Primary | ICD-10-CM

## 2023-02-06 PROCEDURE — 99283 EMERGENCY DEPT VISIT LOW MDM: CPT

## 2023-02-06 PROCEDURE — 51702 INSERT TEMP BLADDER CATH: CPT

## 2023-02-07 ENCOUNTER — TELEPHONE (OUTPATIENT)
Dept: SURGERY | Facility: CLINIC | Age: 63
End: 2023-02-07

## 2023-02-07 VITALS
BODY MASS INDEX: 23.19 KG/M2 | DIASTOLIC BLOOD PRESSURE: 88 MMHG | OXYGEN SATURATION: 95 % | RESPIRATION RATE: 22 BRPM | SYSTOLIC BLOOD PRESSURE: 156 MMHG | HEART RATE: 57 BPM | TEMPERATURE: 97 F | WEIGHT: 162 LBS | HEIGHT: 70 IN

## 2023-02-07 LAB
BILIRUB UR QL STRIP.AUTO: NEGATIVE
CLARITY UR REFRACT.AUTO: CLEAR
COLOR UR AUTO: YELLOW
GLUCOSE UR STRIP.AUTO-MCNC: NEGATIVE MG/DL
KETONES UR STRIP.AUTO-MCNC: NEGATIVE MG/DL
LEUKOCYTE ESTERASE UR QL STRIP.AUTO: NEGATIVE
NITRITE UR QL STRIP.AUTO: NEGATIVE
PH UR STRIP.AUTO: 6.5 [PH] (ref 5–8)
PROT UR STRIP.AUTO-MCNC: NEGATIVE MG/DL
SP GR UR STRIP.AUTO: 1.01 (ref 1–1.03)
UROBILINOGEN UR STRIP.AUTO-MCNC: 0.2 MG/DL

## 2023-02-07 PROCEDURE — 81001 URINALYSIS AUTO W/SCOPE: CPT | Performed by: EMERGENCY MEDICINE

## 2023-02-07 PROCEDURE — 81015 MICROSCOPIC EXAM OF URINE: CPT | Performed by: EMERGENCY MEDICINE

## 2023-02-07 NOTE — ED INITIAL ASSESSMENT (HPI)
Patient reports urinary retention for 2 hours, reports hx of enlarged prostate.  Reports severe lower abd pain

## 2023-02-07 NOTE — TELEPHONE ENCOUNTER
Patients spouse requesting ER follow up appointment. States patient has a catheter that has to be removed. NO appointments available.  Please advise

## 2023-02-08 NOTE — TELEPHONE ENCOUNTER
RN called patient/wife. No answer. Left message to offer appt on 2/23 Thursday at [address] for consult/Sosa removal. Awaits confirmation.      Note, patient was at ER on 2/6 d/t retention

## 2023-02-14 ENCOUNTER — OFFICE VISIT (OUTPATIENT)
Dept: SURGERY | Facility: CLINIC | Age: 63
End: 2023-02-14

## 2023-02-14 DIAGNOSIS — N40.1 BPH WITH OBSTRUCTION/LOWER URINARY TRACT SYMPTOMS: Primary | ICD-10-CM

## 2023-02-14 DIAGNOSIS — N13.8 BPH WITH OBSTRUCTION/LOWER URINARY TRACT SYMPTOMS: Primary | ICD-10-CM

## 2023-02-14 PROCEDURE — 99204 OFFICE O/P NEW MOD 45 MIN: CPT | Performed by: SURGERY

## 2023-02-14 RX ORDER — FINASTERIDE 5 MG/1
5 TABLET, FILM COATED ORAL DAILY
Qty: 90 TABLET | Refills: 6 | Status: SHIPPED | OUTPATIENT
Start: 2023-02-14

## 2023-02-14 RX ORDER — TAMSULOSIN HYDROCHLORIDE 0.4 MG/1
0.4 CAPSULE ORAL 2 TIMES DAILY
Qty: 180 CAPSULE | Refills: 6 | Status: SHIPPED | OUTPATIENT
Start: 2023-02-14

## 2023-02-15 NOTE — PROGRESS NOTES
RN faxed the order of Cure Catheter hydrophilic noemi with water packets and gripper (HM14)  2x daily, 60 per month, 14 Fr to [facility].

## 2023-02-21 ENCOUNTER — OFFICE VISIT (OUTPATIENT)
Facility: LOCATION | Age: 63
End: 2023-02-21
Payer: COMMERCIAL

## 2023-02-21 DIAGNOSIS — K64.2 GRADE III INTERNAL HEMORRHOIDS: Primary | ICD-10-CM

## 2023-02-21 DIAGNOSIS — N40.0 ENLARGED PROSTATE ON RECTAL EXAMINATION: ICD-10-CM

## 2023-02-21 PROCEDURE — 99213 OFFICE O/P EST LOW 20 MIN: CPT | Performed by: COLON & RECTAL SURGERY

## 2023-02-21 PROCEDURE — 46221 LIGATION OF HEMORRHOID(S): CPT | Performed by: COLON & RECTAL SURGERY

## 2023-02-21 NOTE — PATIENT INSTRUCTIONS
This patient presents for further care and treatment regarding his internal hemorrhoids grade 3. He had 2 prior banding sessions. After the second rubber band, he did suffer urinary retention. He needed to be straight cathed several times. The patient is known to have an enlarged prostate. His hemorrhoid symptoms greatly improved with the first rubber band. He had no further relief with the second rubber band. He is still overall better than pretreatment. At today's office visit we treated a right postero-lateral internal hemorrhoid. At today's visit, the patient underwent an uncomplicated application of a rubber band and injection of a 5% phenol solution into the base of the rubberbanded hemorrhoid. The patient tolerated the procedure well. The patient remained stable throughout the entire procedure within my office. The patient was asked to recover in my office for a few minutes prior to leaving for home. The patient should refrain from extreme sports or athletic activity, including heavy lifting. We will see this patient again in 2-3 weeks for further care and treatment. I asked the patient to straight cath himself at 8 PM if he is unable to void by that time. I did  the patient that the urinary retention is related to the rubber band treatments. The same nerve that has to relax to let the urine out caries pain back to the brain from the anus.

## 2023-02-21 NOTE — PROCEDURES
Pre op diagnosis: Internal Hemorrhoids    Post op diagnosis: Same    Procedure: Anoscopy with O-ring Rubber Band Ligation of Internal Hemorrhoids    Surgeon: Mary Puri MD    History of present illness: This patient has internal hemorrhoids that are symptomatic    Operative findings: The internal hemorrhoid was successfully ligated in the standard fashion with an O-ring ligator. Operative summary:  The patient was draped and exposed in the usual fashion. A medical assistant was present at all times. External visualization of the perineum and gluteal cleft was performed. Digital exam was performed with a well lubricated examining finger. Diagnostic Anoscopy was performed with lubrication. The anal canal was well visualized. An internal hemorrhoid was identified and well visualized. The internal hemorrhoid was grasped well above the dentate line with the grasper. The internal hemorrhoid was pulled within the O-ring ligator. The O-ring ligator was fired without complication. A 5% phenol solution was injected into the hemorrhoid and at its base. The patient tolerated the procedure and was observed in our office for at least 5 minutes prior to discharge. All findings are listed in the physical exam section of this note.

## 2023-03-09 ENCOUNTER — OFFICE VISIT (OUTPATIENT)
Facility: LOCATION | Age: 63
End: 2023-03-09
Payer: COMMERCIAL

## 2023-03-09 DIAGNOSIS — E04.1 THYROID NODULE: Primary | ICD-10-CM

## 2023-03-09 DIAGNOSIS — K21.9 LARYNGOPHARYNGEAL REFLUX: ICD-10-CM

## 2023-03-09 PROCEDURE — 99203 OFFICE O/P NEW LOW 30 MIN: CPT | Performed by: OTOLARYNGOLOGY

## 2023-03-09 PROCEDURE — 31575 DIAGNOSTIC LARYNGOSCOPY: CPT | Performed by: OTOLARYNGOLOGY

## 2023-03-21 ENCOUNTER — OFFICE VISIT (OUTPATIENT)
Facility: LOCATION | Age: 63
End: 2023-03-21
Payer: COMMERCIAL

## 2023-03-21 ENCOUNTER — TELEPHONE (OUTPATIENT)
Facility: LOCATION | Age: 63
End: 2023-03-21

## 2023-03-21 VITALS — HEART RATE: 57 BPM | TEMPERATURE: 97 F

## 2023-03-21 DIAGNOSIS — K64.2 GRADE III INTERNAL HEMORRHOIDS: Primary | ICD-10-CM

## 2023-03-21 DIAGNOSIS — N40.0 ENLARGED PROSTATE ON RECTAL EXAMINATION: ICD-10-CM

## 2023-03-21 PROCEDURE — 46221 LIGATION OF HEMORRHOID(S): CPT | Performed by: COLON & RECTAL SURGERY

## 2023-03-21 NOTE — TELEPHONE ENCOUNTER
1404 [facility] lab called. Pt is scheduled for FNA 3/27/23. 1404 [facility] needs thyroid US to compare. If no thyroid US available, pt needs one prior to FNA or bx will be cx. I looked for one and couldn't find one.  mp      [address] [facility] [phone]

## 2023-03-21 NOTE — PROCEDURES
Pre op diagnosis: Internal Hemorrhoids    Post op diagnosis: Same    Procedure: Anoscopy with O-ring Rubber Band Ligation of Internal Hemorrhoids    Surgeon: Elke Meade MD    History of present illness: This patient has internal hemorrhoids that are symptomatic    Operative findings: The internal hemorrhoid was successfully ligated in the standard fashion with an O-ring ligator. Operative summary:  The patient was draped and exposed in the usual fashion. A medical assistant was present at all times. External visualization of the perineum and gluteal cleft was performed. Digital exam was performed with a well lubricated examining finger. Diagnostic Anoscopy was performed with lubrication. The anal canal was well visualized. An internal hemorrhoid was identified and well visualized. The internal hemorrhoid was grasped well above the dentate line with the grasper. The internal hemorrhoid was pulled within the O-ring ligator. The O-ring ligator was fired without complication. A 5% phenol solution was injected into the hemorrhoid and at its base. The patient tolerated the procedure and was observed in our office for at least 5 minutes prior to discharge. All findings are listed in the physical exam section of this note.

## 2023-03-21 NOTE — PATIENT INSTRUCTIONS
At today's office visit we treated a left anterior lateral internal hemorrhoid. At today's visit, the patient underwent an uncomplicated application of a rubber band and injection of a 5% phenol solution into the base of the rubberbanded hemorrhoid. The patient tolerated the procedure well. The patient remained stable throughout the entire procedure within my office. The patient was asked to recover in my office for a few minutes prior to leaving for home. The patient should refrain from extreme sports or athletic activity, including heavy lifting. I have no further follow-up scheduled with this patient at this time. This patient can see me on an as-needed basis. This patient should return urgently for any problems or complications related to my surgical intervention.

## 2023-03-22 ENCOUNTER — TELEPHONE (OUTPATIENT)
Facility: LOCATION | Age: 63
End: 2023-03-22

## 2023-03-22 NOTE — TELEPHONE ENCOUNTER
Our office needs to call this patient and tell them to bring the report and images of their thyroid ultrasound. Patient must have had it done at an outside imaging center. They will not be able to do the FNA if the patient does not bring the report and images as well as show up 20 minutes early to their appointment.

## 2023-03-23 NOTE — TELEPHONE ENCOUNTER
Called and spoke with pt. Pt has CD and physical report. Pt aware needs to arrive 20 minutes early to appt. Pt will also bring CD and report to appt. Called 1404 [facility] spoke to Sada aguilera, notified.  pricila

## 2023-03-24 ENCOUNTER — LAB ENCOUNTER (OUTPATIENT)
Dept: LAB | Facility: HOSPITAL | Age: 63
End: 2023-03-24
Attending: OTOLARYNGOLOGY
Payer: COMMERCIAL

## 2023-03-24 DIAGNOSIS — E04.1 THYROID NODULE: ICD-10-CM

## 2023-03-24 DIAGNOSIS — K21.9 LARYNGOPHARYNGEAL REFLUX: ICD-10-CM

## 2023-03-24 LAB — SARS-COV-2 RNA RESP QL NAA+PROBE: NOT DETECTED

## 2023-03-27 ENCOUNTER — HOSPITAL ENCOUNTER (OUTPATIENT)
Dept: ULTRASOUND IMAGING | Facility: HOSPITAL | Age: 63
Discharge: HOME | End: 2023-03-27
Attending: OTOLARYNGOLOGY
Payer: COMMERCIAL

## 2023-03-27 DIAGNOSIS — E04.1 THYROID NODULE: ICD-10-CM

## 2023-03-27 PROCEDURE — 10006 FNA BX W/US GDN EA ADDL: CPT | Performed by: OTOLARYNGOLOGY

## 2023-03-27 PROCEDURE — 10005 FNA BX W/US GDN 1ST LES: CPT | Performed by: OTOLARYNGOLOGY

## 2023-03-27 PROCEDURE — 88173 CYTOPATH EVAL FNA REPORT: CPT | Performed by: OTOLARYNGOLOGY

## 2023-03-27 PROCEDURE — 88108 CYTOPATH CONCENTRATE TECH: CPT | Performed by: OTOLARYNGOLOGY

## 2023-04-26 ENCOUNTER — TELEPHONE (OUTPATIENT)
Dept: SURGERY | Facility: CLINIC | Age: 63
End: 2023-04-26

## 2023-04-26 NOTE — TELEPHONE ENCOUNTER
RN called patient. He added himself on 5/1 but not a procedure time slot. RN placed patient on 5/11 Thursday at 1 PM. No answer. Left message.

## 2023-04-27 NOTE — TELEPHONE ENCOUNTER
Second time. RN called patient. He added himself on 5/1 but not a procedure time slot. RN placed patient on 5/11 Thursday at 1 PM. Patient aware and agreeable to plans.

## 2023-05-04 ENCOUNTER — PATIENT OUTREACH (OUTPATIENT)
Facility: LOCATION | Age: 63
End: 2023-05-04

## 2023-05-11 ENCOUNTER — PROCEDURE (OUTPATIENT)
Dept: SURGERY | Facility: CLINIC | Age: 63
End: 2023-05-11

## 2023-05-11 ENCOUNTER — TELEPHONE (OUTPATIENT)
Dept: SURGERY | Facility: CLINIC | Age: 63
End: 2023-05-11

## 2023-05-11 VITALS — SYSTOLIC BLOOD PRESSURE: 181 MMHG | DIASTOLIC BLOOD PRESSURE: 81 MMHG | HEART RATE: 62 BPM

## 2023-05-11 DIAGNOSIS — N40.1 BPH WITH OBSTRUCTION/LOWER URINARY TRACT SYMPTOMS: Primary | ICD-10-CM

## 2023-05-11 DIAGNOSIS — N13.8 BPH WITH OBSTRUCTION/LOWER URINARY TRACT SYMPTOMS: Primary | ICD-10-CM

## 2023-05-11 LAB
APPEARANCE: CLEAR
BILIRUBIN: NEGATIVE
GLUCOSE (URINE DIPSTICK): NEGATIVE MG/DL
KETONES (URINE DIPSTICK): NEGATIVE MG/DL
LEUKOCYTES: NEGATIVE
MULTISTIX LOT#: ABNORMAL NUMERIC
NITRITE, URINE: NEGATIVE
PH, URINE: 5.5 (ref 4.5–8)
PROTEIN (URINE DIPSTICK): NEGATIVE MG/DL
SPECIFIC GRAVITY: 1.03 (ref 1–1.03)
URINE-COLOR: YELLOW
UROBILINOGEN,SEMI-QN: 0.2 MG/DL (ref 0–1.9)

## 2023-05-11 PROCEDURE — 99213 OFFICE O/P EST LOW 20 MIN: CPT | Performed by: SURGERY

## 2023-05-11 PROCEDURE — 3079F DIAST BP 80-89 MM HG: CPT | Performed by: SURGERY

## 2023-05-11 PROCEDURE — 81003 URINALYSIS AUTO W/O SCOPE: CPT | Performed by: SURGERY

## 2023-05-11 PROCEDURE — 3077F SYST BP >= 140 MM HG: CPT | Performed by: SURGERY

## 2023-05-11 PROCEDURE — 52000 CYSTOURETHROSCOPY: CPT | Performed by: SURGERY

## 2023-05-11 RX ORDER — CIPROFLOXACIN 500 MG/1
500 TABLET, FILM COATED ORAL ONCE
Status: COMPLETED | OUTPATIENT
Start: 2023-05-11 | End: 2023-05-11

## 2023-05-11 RX ADMIN — CIPROFLOXACIN 500 MG: 500 TABLET, FILM COATED ORAL at 13:18:00

## 2023-05-11 NOTE — TELEPHONE ENCOUNTER
Hi,    Can you please schedule this patient for surgery. Also, can you arrange for the patient to drop a urine sample for urine culture 1-2 weeks prior to the scheduled surgery date? I placed the order. Thanks,  [facility]     Urology Surgery Request  Surgeon: Yehuda Bliss  Location (if known): [facility]  Procedure: TURP   Anesthesia: General   Time Frame: Next available  Time required: 90 minutes  Diagnosis: BPH  Special Equipment: Bipolar resectoscope    Antibiotics: per hospital protocol unless checked below   ___ Levaquin 500 mg IV   ___ Gemcitabine 2 g/100 mL NS bladder instillation to be given in OR    Estimated Post Op/Follow Up Appt:   4 weeks for symptom check. Please schedule appointment at time of surgery scheduling.

## 2023-05-14 NOTE — PROGRESS NOTES
Albin Hardin,    I have reviewed your urine test results. Tests show no significant abnormalities (no signs of infection in the urine). No changes to the plan as we had previously discussed. Please let me know if you have any questions.     Thanks,  Mckenna Hinton MD

## 2023-05-25 ENCOUNTER — PATIENT MESSAGE (OUTPATIENT)
Dept: SURGERY | Facility: CLINIC | Age: 63
End: 2023-05-25

## 2023-05-25 ENCOUNTER — TELEPHONE (OUTPATIENT)
Dept: SURGERY | Facility: CLINIC | Age: 63
End: 2023-05-25

## 2023-05-25 DIAGNOSIS — R33.9 URINARY RETENTION: Primary | ICD-10-CM

## 2023-05-25 NOTE — TELEPHONE ENCOUNTER
----- [facility] [employer] [facility] Elle Dior sent at 5/25/2023 11:04 AM CDT -----  Regarding: Pain on the groin -taking Fenasteride  Contact: [phone]  1- After my last visit concluded that we will get the TURP done. You recommended that i start taking Fenasteride on daily basis till the procedure is done. I feel after i started the medication this pain/ pressure while urinating in the entire penis system has started. I am assuming that could be the reason i am going multiple times. Also had to use self catheter two times this week before going to bed. 2- Nobody from your office called for scheduling. I tried multiple times and the answer is that they have send a message to . Thank you and would appreciate a quick response.     Wilfred

## 2023-07-06 ENCOUNTER — LAB ENCOUNTER (OUTPATIENT)
Dept: LAB | Facility: HOSPITAL | Age: 63
End: 2023-07-06
Attending: SURGERY
Payer: COMMERCIAL

## 2023-07-06 DIAGNOSIS — R33.9 URINARY RETENTION: ICD-10-CM

## 2023-07-06 LAB
ANION GAP SERPL CALC-SCNC: 7 MMOL/L (ref 0–18)
BUN BLD-MCNC: 10 MG/DL (ref 7–18)
CALCIUM BLD-MCNC: 8.9 MG/DL (ref 8.5–10.1)
CHLORIDE SERPL-SCNC: 109 MMOL/L (ref 98–112)
CO2 SERPL-SCNC: 25 MMOL/L (ref 21–32)
CREAT BLD-MCNC: 0.91 MG/DL
FASTING STATUS PATIENT QL REPORTED: YES
GFR SERPLBLD BASED ON 1.73 SQ M-ARVRAT: 94 ML/MIN/1.73M2 (ref 60–?)
GLUCOSE BLD-MCNC: 114 MG/DL (ref 70–99)
OSMOLALITY SERPL CALC.SUM OF ELEC: 292 MOSM/KG (ref 275–295)
POTASSIUM SERPL-SCNC: 4 MMOL/L (ref 3.5–5.1)
SODIUM SERPL-SCNC: 141 MMOL/L (ref 136–145)

## 2023-07-06 PROCEDURE — 80048 BASIC METABOLIC PNL TOTAL CA: CPT

## 2023-07-06 PROCEDURE — 36415 COLL VENOUS BLD VENIPUNCTURE: CPT

## 2023-07-07 ENCOUNTER — EKG ENCOUNTER (OUTPATIENT)
Dept: LAB | Facility: HOSPITAL | Age: 63
End: 2023-07-07
Attending: SURGERY
Payer: COMMERCIAL

## 2023-07-07 DIAGNOSIS — Z01.818 PRE-OP TESTING: ICD-10-CM

## 2023-07-07 PROCEDURE — 93005 ELECTROCARDIOGRAM TRACING: CPT

## 2023-07-07 PROCEDURE — 93010 ELECTROCARDIOGRAM REPORT: CPT | Performed by: INTERNAL MEDICINE

## 2023-07-08 LAB
ATRIAL RATE: 56 BPM
P AXIS: 49 DEGREES
P-R INTERVAL: 130 MS
Q-T INTERVAL: 444 MS
QRS DURATION: 96 MS
QTC CALCULATION (BEZET): 428 MS
R AXIS: 6 DEGREES
T AXIS: 30 DEGREES
VENTRICULAR RATE: 56 BPM

## 2023-07-10 ENCOUNTER — PATIENT MESSAGE (OUTPATIENT)
Dept: SURGERY | Facility: CLINIC | Age: 63
End: 2023-07-10

## 2023-07-10 ENCOUNTER — TELEPHONE (OUTPATIENT)
Dept: SURGERY | Facility: CLINIC | Age: 63
End: 2023-07-10

## 2023-07-10 DIAGNOSIS — R33.9 URINARY RETENTION: Primary | ICD-10-CM

## 2023-07-10 DIAGNOSIS — N40.0 BENIGN PROSTATIC HYPERPLASIA, UNSPECIFIED WHETHER LOWER URINARY TRACT SYMPTOMS PRESENT: ICD-10-CM

## 2023-07-10 NOTE — TELEPHONE ENCOUNTER
----- Message from [facility] Way[facility] Oropeza sent at 7/10/2023 10:26 AM CDT -----  Regarding: Frequent feeling to urinate  Contact: [phone]  Last couple of days   1- i have been feeling the need to go to bathroom in-spite of taking scheduled flow max med. 2- my penis hurts while passing urinate. Note - once in a while i have been using self catheter. 3- Steam is not bad, but after i come back from the bathroom the feeling of going again stays. And i feel that coming from mid portion of penis. 4- just this morning i saw some foreign object in my urine. Not sure it was mucus kind of thing. Please let me know if i need to do anything different.        Thanks  [employer]

## 2023-07-10 NOTE — TELEPHONE ENCOUNTER
Called pt to discuss below. Pt c/o dysuria and pain when urinating. Denies incomplete bladder emptying, fever, n/v, and gross hematuria. Urine culture order placed. Pt will proceed to lab. Advised pt to push fluids and avoid bladder irritants. Call with worsening symptoms. Verbalized understanding.

## 2023-07-11 ENCOUNTER — LAB ENCOUNTER (OUTPATIENT)
Dept: LAB | Facility: HOSPITAL | Age: 63
End: 2023-07-11
Attending: FAMILY MEDICINE
Payer: COMMERCIAL

## 2023-07-11 PROCEDURE — 87186 SC STD MICRODIL/AGAR DIL: CPT | Performed by: SURGERY

## 2023-07-11 PROCEDURE — 87086 URINE CULTURE/COLONY COUNT: CPT | Performed by: SURGERY

## 2023-07-11 PROCEDURE — 87077 CULTURE AEROBIC IDENTIFY: CPT | Performed by: SURGERY

## 2023-07-11 NOTE — TELEPHONE ENCOUNTER
From: Mick Rubin  To: Mirtha Allen MD  Sent: 7/10/2023 10:26 AM CDT  Subject: Frequent feeling to urinate    Last couple of days   1- i have been feeling the need to go to bathroom in-spite of taking scheduled flow max med. 2- my penis hurts while passing urinate. Note - once in a while i have been using self catheter. 3- Steam is not bad, but after i come back from the bathroom the feeling of going again stays. And i feel that coming from mid portion of penis. 4- just this morning i saw some foreign object in my urine. Not sure it was mucus kind of thing. Please let me know if i need to do anything different.        Thanks  Zarpo

## 2023-07-12 ENCOUNTER — TELEPHONE (OUTPATIENT)
Dept: SURGERY | Facility: CLINIC | Age: 63
End: 2023-07-12

## 2023-07-12 RX ORDER — SULFAMETHOXAZOLE AND TRIMETHOPRIM 800; 160 MG/1; MG/1
1 TABLET ORAL 2 TIMES DAILY
Qty: 18 TABLET | Refills: 0 | Status: SHIPPED | OUTPATIENT
Start: 2023-07-12 | End: 2023-07-21

## 2023-07-12 NOTE — TELEPHONE ENCOUNTER
----- Message from [address] [place], [place] [email or web address] Simona Colon sent at 7/12/2023  9:53 AM CDT -----  Regarding: Frequent feeling to urinate  Contact: [phone]  Pl let me know ASAP,  having the feeling every 2 min to go to bathroom. And it is hurting.

## 2023-07-12 NOTE — TELEPHONE ENCOUNTER
Called pt to discuss below. Pt states he has frequency and dysuria. Gave urine culture yesterday. Recommended pt push fluids, avoid bladder irritants, and take AZO. Final culture should result tomorrow, 7/13. : Do you wish to start pt on any antibiotics? Pt has procedure scheduled for 7/19.

## 2023-07-13 NOTE — TELEPHONE ENCOUNTER
This encounter is completed. Mendel Pence, MD  You14 hours ago (5:48 PM)     Thanks. Just sent Bactrim and sent [facility]. Will see what culture finalizes as tomorrow.

## 2023-07-18 ENCOUNTER — ANESTHESIA EVENT (OUTPATIENT)
Dept: SURGERY | Facility: HOSPITAL | Age: 63
End: 2023-07-18
Payer: COMMERCIAL

## 2023-07-19 ENCOUNTER — HOSPITAL ENCOUNTER (OUTPATIENT)
Facility: HOSPITAL | Age: 63
Discharge: HOME | End: 2023-07-20
Attending: SURGERY | Admitting: SURGERY
Payer: COMMERCIAL

## 2023-07-19 ENCOUNTER — ANESTHESIA (OUTPATIENT)
Dept: SURGERY | Facility: HOSPITAL | Age: 63
End: 2023-07-19
Payer: COMMERCIAL

## 2023-07-19 DIAGNOSIS — R33.9 URINARY RETENTION: ICD-10-CM

## 2023-07-19 DIAGNOSIS — Z01.818 PRE-OP TESTING: Primary | ICD-10-CM

## 2023-07-19 PROBLEM — N13.8 BENIGN PROSTATIC HYPERPLASIA WITH URINARY OBSTRUCTION: Status: ACTIVE | Noted: 2023-07-19

## 2023-07-19 PROBLEM — R42 VERTIGO: Status: ACTIVE | Noted: 2023-07-19

## 2023-07-19 PROBLEM — K21.9 GASTROESOPHAGEAL REFLUX DISEASE WITHOUT ESOPHAGITIS: Status: ACTIVE | Noted: 2023-07-19

## 2023-07-19 PROBLEM — N40.1 BENIGN PROSTATIC HYPERPLASIA WITH URINARY OBSTRUCTION: Status: ACTIVE | Noted: 2023-07-19

## 2023-07-19 PROBLEM — I10 PRIMARY HYPERTENSION: Status: ACTIVE | Noted: 2023-07-19

## 2023-07-19 PROCEDURE — 99204 OFFICE O/P NEW MOD 45 MIN: CPT | Performed by: STUDENT IN AN ORGANIZED HEALTH CARE EDUCATION/TRAINING PROGRAM

## 2023-07-19 PROCEDURE — 0VT08ZZ RESECTION OF PROSTATE, VIA NATURAL OR ARTIFICIAL OPENING ENDOSCOPIC: ICD-10-PCS | Performed by: SURGERY

## 2023-07-19 PROCEDURE — 52601 PROSTATECTOMY (TURP): CPT | Performed by: SURGERY

## 2023-07-19 RX ORDER — POLYETHYLENE GLYCOL 3350 17 G/17G
17 POWDER, FOR SOLUTION ORAL DAILY
Status: DISCONTINUED | OUTPATIENT
Start: 2023-07-19 | End: 2023-07-20

## 2023-07-19 RX ORDER — HYDROCODONE BITARTRATE AND ACETAMINOPHEN 5; 325 MG/1; MG/1
1 TABLET ORAL ONCE AS NEEDED
Status: DISCONTINUED | OUTPATIENT
Start: 2023-07-19 | End: 2023-07-19 | Stop reason: HOSPADM

## 2023-07-19 RX ORDER — METOCLOPRAMIDE HYDROCHLORIDE 5 MG/ML
INJECTION INTRAMUSCULAR; INTRAVENOUS
Status: DISCONTINUED
Start: 2023-07-19 | End: 2023-07-19 | Stop reason: WASHOUT

## 2023-07-19 RX ORDER — ACETAMINOPHEN 500 MG
1000 TABLET ORAL ONCE
Status: DISCONTINUED | OUTPATIENT
Start: 2023-07-19 | End: 2023-07-19 | Stop reason: HOSPADM

## 2023-07-19 RX ORDER — CEFAZOLIN SODIUM/WATER 2 G/20 ML
2 SYRINGE (ML) INTRAVENOUS ONCE
Status: COMPLETED | OUTPATIENT
Start: 2023-07-19 | End: 2023-07-19

## 2023-07-19 RX ORDER — DEXAMETHASONE SODIUM PHOSPHATE 4 MG/ML
VIAL (ML) INJECTION AS NEEDED
Status: DISCONTINUED | OUTPATIENT
Start: 2023-07-19 | End: 2023-07-19 | Stop reason: SURG

## 2023-07-19 RX ORDER — SODIUM CHLORIDE, SODIUM LACTATE, POTASSIUM CHLORIDE, CALCIUM CHLORIDE 600; 310; 30; 20 MG/100ML; MG/100ML; MG/100ML; MG/100ML
INJECTION, SOLUTION INTRAVENOUS CONTINUOUS
Status: DISCONTINUED | OUTPATIENT
Start: 2023-07-19 | End: 2023-07-20

## 2023-07-19 RX ORDER — HYDROMORPHONE HYDROCHLORIDE 1 MG/ML
0.6 INJECTION, SOLUTION INTRAMUSCULAR; INTRAVENOUS; SUBCUTANEOUS EVERY 5 MIN PRN
Status: DISCONTINUED | OUTPATIENT
Start: 2023-07-19 | End: 2023-07-19 | Stop reason: HOSPADM

## 2023-07-19 RX ORDER — BISACODYL 10 MG
10 SUPPOSITORY, RECTAL RECTAL
Status: DISCONTINUED | OUTPATIENT
Start: 2023-07-19 | End: 2023-07-20

## 2023-07-19 RX ORDER — ONDANSETRON 4 MG/1
4 TABLET, ORALLY DISINTEGRATING ORAL EVERY 6 HOURS PRN
Status: DISCONTINUED | OUTPATIENT
Start: 2023-07-19 | End: 2023-07-20

## 2023-07-19 RX ORDER — TAMSULOSIN HYDROCHLORIDE 0.4 MG/1
0.4 CAPSULE ORAL 2 TIMES DAILY
Status: DISCONTINUED | OUTPATIENT
Start: 2023-07-19 | End: 2023-07-20

## 2023-07-19 RX ORDER — ONDANSETRON 2 MG/ML
INJECTION INTRAMUSCULAR; INTRAVENOUS
Status: COMPLETED
Start: 2023-07-19 | End: 2023-07-19

## 2023-07-19 RX ORDER — HYDROMORPHONE HYDROCHLORIDE 1 MG/ML
0.4 INJECTION, SOLUTION INTRAMUSCULAR; INTRAVENOUS; SUBCUTANEOUS EVERY 5 MIN PRN
Status: DISCONTINUED | OUTPATIENT
Start: 2023-07-19 | End: 2023-07-19 | Stop reason: HOSPADM

## 2023-07-19 RX ORDER — MIDAZOLAM HYDROCHLORIDE 1 MG/ML
1 INJECTION INTRAMUSCULAR; INTRAVENOUS EVERY 5 MIN PRN
Status: DISCONTINUED | OUTPATIENT
Start: 2023-07-19 | End: 2023-07-19 | Stop reason: HOSPADM

## 2023-07-19 RX ORDER — DIPHENHYDRAMINE HYDROCHLORIDE 50 MG/ML
12.5 INJECTION INTRAMUSCULAR; INTRAVENOUS AS NEEDED
Status: DISCONTINUED | OUTPATIENT
Start: 2023-07-19 | End: 2023-07-19 | Stop reason: HOSPADM

## 2023-07-19 RX ORDER — HYDROMORPHONE HYDROCHLORIDE 1 MG/ML
0.4 INJECTION, SOLUTION INTRAMUSCULAR; INTRAVENOUS; SUBCUTANEOUS EVERY 2 HOUR PRN
Status: DISCONTINUED | OUTPATIENT
Start: 2023-07-19 | End: 2023-07-20

## 2023-07-19 RX ORDER — PANTOPRAZOLE SODIUM 20 MG/1
20 TABLET, DELAYED RELEASE ORAL
Status: DISCONTINUED | OUTPATIENT
Start: 2023-07-20 | End: 2023-07-20

## 2023-07-19 RX ORDER — LIDOCAINE HYDROCHLORIDE 10 MG/ML
INJECTION, SOLUTION EPIDURAL; INFILTRATION; INTRACAUDAL; PERINEURAL AS NEEDED
Status: DISCONTINUED | OUTPATIENT
Start: 2023-07-19 | End: 2023-07-19 | Stop reason: SURG

## 2023-07-19 RX ORDER — MEPERIDINE HYDROCHLORIDE 25 MG/ML
12.5 INJECTION INTRAMUSCULAR; INTRAVENOUS; SUBCUTANEOUS AS NEEDED
Status: DISCONTINUED | OUTPATIENT
Start: 2023-07-19 | End: 2023-07-19 | Stop reason: HOSPADM

## 2023-07-19 RX ORDER — ROCURONIUM BROMIDE 10 MG/ML
INJECTION, SOLUTION INTRAVENOUS AS NEEDED
Status: DISCONTINUED | OUTPATIENT
Start: 2023-07-19 | End: 2023-07-19 | Stop reason: SURG

## 2023-07-19 RX ORDER — METOCLOPRAMIDE HYDROCHLORIDE 5 MG/ML
10 INJECTION INTRAMUSCULAR; INTRAVENOUS EVERY 8 HOURS PRN
Status: DISCONTINUED | OUTPATIENT
Start: 2023-07-19 | End: 2023-07-19 | Stop reason: HOSPADM

## 2023-07-19 RX ORDER — HYDROMORPHONE HYDROCHLORIDE 1 MG/ML
0.8 INJECTION, SOLUTION INTRAMUSCULAR; INTRAVENOUS; SUBCUTANEOUS EVERY 2 HOUR PRN
Status: DISCONTINUED | OUTPATIENT
Start: 2023-07-19 | End: 2023-07-20

## 2023-07-19 RX ORDER — ONDANSETRON 2 MG/ML
4 INJECTION INTRAMUSCULAR; INTRAVENOUS EVERY 6 HOURS PRN
Status: DISCONTINUED | OUTPATIENT
Start: 2023-07-19 | End: 2023-07-19 | Stop reason: HOSPADM

## 2023-07-19 RX ORDER — HYDROMORPHONE HYDROCHLORIDE 1 MG/ML
0.2 INJECTION, SOLUTION INTRAMUSCULAR; INTRAVENOUS; SUBCUTANEOUS EVERY 5 MIN PRN
Status: DISCONTINUED | OUTPATIENT
Start: 2023-07-19 | End: 2023-07-19 | Stop reason: HOSPADM

## 2023-07-19 RX ORDER — MELATONIN
3 NIGHTLY PRN
Status: DISCONTINUED | OUTPATIENT
Start: 2023-07-19 | End: 2023-07-20

## 2023-07-19 RX ORDER — ONDANSETRON 2 MG/ML
4 INJECTION INTRAMUSCULAR; INTRAVENOUS EVERY 6 HOURS PRN
Status: DISCONTINUED | OUTPATIENT
Start: 2023-07-19 | End: 2023-07-20

## 2023-07-19 RX ORDER — ACETAMINOPHEN 500 MG
1000 TABLET ORAL EVERY 8 HOURS SCHEDULED
Status: DISCONTINUED | OUTPATIENT
Start: 2023-07-19 | End: 2023-07-20

## 2023-07-19 RX ORDER — TIMOLOL MALEATE 5 MG/ML
1 SOLUTION/ DROPS OPHTHALMIC DAILY
COMMUNITY
Start: 2023-05-22

## 2023-07-19 RX ORDER — HYDROMORPHONE HYDROCHLORIDE 1 MG/ML
INJECTION, SOLUTION INTRAMUSCULAR; INTRAVENOUS; SUBCUTANEOUS
Status: COMPLETED
Start: 2023-07-19 | End: 2023-07-19

## 2023-07-19 RX ORDER — HYDROCODONE BITARTRATE AND ACETAMINOPHEN 5; 325 MG/1; MG/1
2 TABLET ORAL ONCE AS NEEDED
Status: DISCONTINUED | OUTPATIENT
Start: 2023-07-19 | End: 2023-07-19 | Stop reason: HOSPADM

## 2023-07-19 RX ORDER — SENNOSIDES 8.6 MG
17.2 TABLET ORAL NIGHTLY PRN
Status: DISCONTINUED | OUTPATIENT
Start: 2023-07-19 | End: 2023-07-20

## 2023-07-19 RX ORDER — OXYCODONE HYDROCHLORIDE 5 MG/1
5 TABLET ORAL EVERY 4 HOURS PRN
Status: DISCONTINUED | OUTPATIENT
Start: 2023-07-19 | End: 2023-07-20

## 2023-07-19 RX ORDER — ONDANSETRON 2 MG/ML
INJECTION INTRAMUSCULAR; INTRAVENOUS AS NEEDED
Status: DISCONTINUED | OUTPATIENT
Start: 2023-07-19 | End: 2023-07-19 | Stop reason: SURG

## 2023-07-19 RX ORDER — SODIUM CHLORIDE 9 MG/ML
INJECTION, SOLUTION INTRAVENOUS CONTINUOUS
Status: DISCONTINUED | OUTPATIENT
Start: 2023-07-19 | End: 2023-07-20

## 2023-07-19 RX ORDER — MIDAZOLAM HYDROCHLORIDE 1 MG/ML
INJECTION INTRAMUSCULAR; INTRAVENOUS AS NEEDED
Status: DISCONTINUED | OUTPATIENT
Start: 2023-07-19 | End: 2023-07-19 | Stop reason: SURG

## 2023-07-19 RX ORDER — ACETAMINOPHEN 500 MG
1000 TABLET ORAL ONCE AS NEEDED
Status: DISCONTINUED | OUTPATIENT
Start: 2023-07-19 | End: 2023-07-19 | Stop reason: HOSPADM

## 2023-07-19 RX ORDER — SODIUM CHLORIDE, SODIUM LACTATE, POTASSIUM CHLORIDE, CALCIUM CHLORIDE 600; 310; 30; 20 MG/100ML; MG/100ML; MG/100ML; MG/100ML
INJECTION, SOLUTION INTRAVENOUS CONTINUOUS
Status: DISCONTINUED | OUTPATIENT
Start: 2023-07-19 | End: 2023-07-19 | Stop reason: HOSPADM

## 2023-07-19 RX ORDER — NALOXONE HYDROCHLORIDE 0.4 MG/ML
80 INJECTION, SOLUTION INTRAMUSCULAR; INTRAVENOUS; SUBCUTANEOUS AS NEEDED
Status: DISCONTINUED | OUTPATIENT
Start: 2023-07-19 | End: 2023-07-19 | Stop reason: HOSPADM

## 2023-07-19 RX ORDER — MECLIZINE HCL 12.5 MG/1
25 TABLET ORAL 3 TIMES DAILY PRN
Status: DISCONTINUED | OUTPATIENT
Start: 2023-07-19 | End: 2023-07-20

## 2023-07-19 RX ORDER — OXYCODONE HYDROCHLORIDE 10 MG/1
10 TABLET ORAL EVERY 4 HOURS PRN
Status: DISCONTINUED | OUTPATIENT
Start: 2023-07-19 | End: 2023-07-20

## 2023-07-19 RX ORDER — PROCHLORPERAZINE EDISYLATE 5 MG/ML
INJECTION INTRAMUSCULAR; INTRAVENOUS
Status: DISCONTINUED
Start: 2023-07-19 | End: 2023-07-19 | Stop reason: WASHOUT

## 2023-07-19 RX ORDER — CEFAZOLIN SODIUM/WATER 2 G/20 ML
2 SYRINGE (ML) INTRAVENOUS EVERY 8 HOURS
Status: COMPLETED | OUTPATIENT
Start: 2023-07-19 | End: 2023-07-20

## 2023-07-19 RX ADMIN — DEXAMETHASONE SODIUM PHOSPHATE 8 MG: 4 MG/ML VIAL (ML) INJECTION at 08:40:00

## 2023-07-19 RX ADMIN — SODIUM CHLORIDE, SODIUM LACTATE, POTASSIUM CHLORIDE, CALCIUM CHLORIDE: 600; 310; 30; 20 INJECTION, SOLUTION INTRAVENOUS at 11:22:00

## 2023-07-19 RX ADMIN — ROCURONIUM BROMIDE 50 MG: 10 INJECTION, SOLUTION INTRAVENOUS at 08:33:00

## 2023-07-19 RX ADMIN — CEFAZOLIN SODIUM/WATER 2 G: 2 G/20 ML SYRINGE (ML) INTRAVENOUS at 08:28:00

## 2023-07-19 RX ADMIN — MIDAZOLAM HYDROCHLORIDE 2 MG: 1 INJECTION INTRAMUSCULAR; INTRAVENOUS at 08:28:00

## 2023-07-19 RX ADMIN — ONDANSETRON 4 MG: 2 INJECTION INTRAMUSCULAR; INTRAVENOUS at 11:03:00

## 2023-07-19 RX ADMIN — ROCURONIUM BROMIDE 10 MG: 10 INJECTION, SOLUTION INTRAVENOUS at 10:10:00

## 2023-07-19 RX ADMIN — SODIUM CHLORIDE, SODIUM LACTATE, POTASSIUM CHLORIDE, CALCIUM CHLORIDE: 600; 310; 30; 20 INJECTION, SOLUTION INTRAVENOUS at 08:28:00

## 2023-07-19 RX ADMIN — ROCURONIUM BROMIDE 30 MG: 10 INJECTION, SOLUTION INTRAVENOUS at 09:34:00

## 2023-07-19 RX ADMIN — SODIUM CHLORIDE, SODIUM LACTATE, POTASSIUM CHLORIDE, CALCIUM CHLORIDE: 600; 310; 30; 20 INJECTION, SOLUTION INTRAVENOUS at 09:35:00

## 2023-07-19 RX ADMIN — LIDOCAINE HYDROCHLORIDE 50 MG: 10 INJECTION, SOLUTION EPIDURAL; INFILTRATION; INTRACAUDAL; PERINEURAL at 08:33:00

## 2023-07-19 NOTE — H&P
UROLOGY PRE-OPERATIVE HISTORY & PHYSICAL      Juan Chan Patient Status:  Outpatient in a Bed    6/15/1960 MRN [id number]   Location [address] [place] [address] Attending Hira Art MD   Hosp Day # 0 PCP Lupe Burch DO     Primary Care Provider: Valentina Alicia DO     Chief Complaint:   BPH/LUTS    History of Present Illness:   Juan Chan is a 70year old male with history of GERD, hypertension, hyperlipidemia, vertigo, BPH/LUTS on tamsulosin, elevated PSA status post biopsy by Dr. Sunny Santo on 2021. He has been followed by [facility] urology. Of note, he did have a prostate MRI on 2019 that showed a 63 g prostate, PI-RADS 2 study. Prostate biopsy pathology showed no malignancy, only focal chronic inflammation. He had a recent episode of urinary retention requiring Sosa catheter placement. On 23 he passed a void trial with low PVR and I taught him CIC. Cystoscopy showed trilobar hyperplasia and obstructive appearance of an enlarged prostate with intravesical protrusion. He has not been taking the finasteride and things have been okay on the tamsulosin alone. Given BPH/LUTS refractory to medical therapy, I offered the patient transurethral resection of the prostate (TURP) to relieve his bladder outlet obstruction. I discussed the risks including bleeding, infection, damage to surrounding structures, retrograde ejaculation, urinary incontinence, and post-operative urinary retention. I discussed the typical hospital course of 1 night with continuous bladder irrigation, and Sosa removal 1-3 days after surgery. I emphasized that after surgery he should not do any heavy lifting >15 lbs and should avoid constipation for 4 weeks, to prevent re-bleeding of the prostate. I also discussed other options, including Urolift (for smaller prostate without median lobe) and laser enucleation or ablation of the prostate.      He understands the risks and benefits and elects to proceed with TURP. He will hold any blood thinners (if applicable) prior to surgery. History:     Past Medical History:   Diagnosis Date    BPH (benign prostatic hyperplasia)     Esophageal reflux     Essential hypertension     Glaucoma     Hearing impairment     Tinnitis    High blood pressure     High cholesterol     Hyperlipidemia     Vertigo     Vertigo     Visual impairment     glasses       Past Surgical History:   Procedure Laterality Date    COLONOSCOPY      COLONOSCOPY N/A 2022    Procedure: COLONOSCOPY;  Surgeon: Joe Howell MD;  Location: [facility] ENDOSCOPY    HAND/FINGER SURGERY UNLISTED Left 10/23/2019    left trigger finger    HERNIA SURGERY Bilateral 2022    OTHER SURGICAL HISTORY  2021    Prostate Biopsy Dr. Faustina Carlson ENDOSCOPY,EXAM         History reviewed. No pertinent family history. Social History     Socioeconomic History    Marital status:    Tobacco Use    Smoking status: Every Day     Packs/day: 0.00     Types: Cigarettes     Start date: 1975     Last attempt to quit: 2005     Years since quittin.2    Smokeless tobacco: Never   Vaping Use    Vaping Use: Never used   Substance and Sexual Activity    Alcohol use: Yes     Alcohol/week: 6.0 standard drinks of alcohol     Types: 6 Shots of liquor per week    Drug use: Never       Medications:  No current outpatient medications on file. Allergies:  No Known Allergies    Review of Systems:   A comprehensive 10-point review of systems was completed. Pertinent positives and negatives are noted in the the HPI. Physical Exam:   Vital Signs:  Height 5' 10\" (1.778 m), weight 162 lb (73.5 kg).      CONSTITUTIONAL: Well developed, well nourished, in no acute distress  NEUROLOGIC: Alert and oriented  HEAD: Normocephalic, atraumatic  EYES: Sclera non-icteric  ENT: Hearing intact, moist mucous membranes  NECK: No obvious goiter or masses  RESPIRATORY: Normal respiratory effort  SKIN: No evident rashes  ABDOMEN: Soft, non-tender, non-distended    Laboratory Data:  No results found for: WBC, HGB, PLT  Lab Results   Component Value Date     07/06/2023    K 4.0 07/06/2023     07/06/2023    CO2 25.0 07/06/2023    BUN 10 07/06/2023     (H) 07/06/2023    GFRAA 101 04/29/2022    CA 8.9 07/06/2023       Urinalysis Results (last three years):  Recent Labs     10/23/20  0852 12/31/20  1437 01/07/21  1438 02/07/23  0022 05/11/23  1310   COLORUR  --   --   --  Yellow  --    CLARITY  --   --   --  Clear  --    SPECGRAVITY 1.020 1.025 1.025 1.015 1.030   PHURINE 8.5* 6.5 7.0 6.5 5.5   PROUR  --   --   --  Negative  --    GLUUR  --   --   --  Negative  --    KETUR  --   --   --  Negative  --    BILUR  --   --   --  Negative  --    BLOODURINE  --   --   --  Small*  --    NITRITE neg neg neg Negative Negative   UROBILINOGEN  --   --   --  0.2  --    LEUUR  --   --   --  Negative  --    WBCUR  --   --   --  1-5  1-5  --    RBCUR  --   --   --  0-2  0-2  --    BACUR  --   --   --  Rare*  Rare*  --        Urine Culture Results (last three years):  Lab Results   Component Value Date    URINECUL 50,000-99,000 CFU/ML Proteus mirabilis (A) 07/11/2023    URINECUL No Growth 2 Days 05/11/2023       PSA:  Lab Results   Component Value Date    PSA 10.40 (H) 06/04/2021    PSA 4.19 (H) 08/15/2019        Imaging (last three days):  No results found. Assessment:   Esther Ng is a 70year old male with history of GERD, hypertension, hyperlipidemia, vertigo, BPH/LUTS on tamsulosin, elevated PSA status post biopsy by Dr. Thomas Guerra on 1/7/2021. He has been followed by [facility] urology. Of note, he did have a prostate MRI on 9/16/2019 that showed a 63 g prostate, PI-RADS 2 study. Prostate biopsy pathology showed no malignancy, only focal chronic inflammation. He had a recent episode of urinary retention requiring Sosa catheter placement. On 2/14/23 he passed a void trial with low PVR and I taught him CIC. Cystoscopy showed trilobar hyperplasia and obstructive appearance of an enlarged prostate with intravesical protrusion. He has not been taking the finasteride and things have been okay on the tamsulosin alone. Given BPH/LUTS refractory to medical therapy, I offered the patient transurethral resection of the prostate (TURP) to relieve his bladder outlet obstruction. I discussed the risks including bleeding, infection, damage to surrounding structures, retrograde ejaculation, urinary incontinence, and post-operative urinary retention. I discussed the typical hospital course of 1 night with continuous bladder irrigation, and Sosa removal 1-3 days after surgery. I emphasized that after surgery he should not do any heavy lifting >15 lbs and should avoid constipation for 4 weeks, to prevent re-bleeding of the prostate. I also discussed other options, including Urolift (for smaller prostate without median lobe) and laser enucleation or ablation of the prostate. He understands the risks and benefits and elects to proceed with TURP. He will hold any blood thinners (if applicable) prior to surgery. Plan:   - OR for TURP   - Informed consent obtained - risks and benefits explained, and all questions answered    I have personally reviewed all relevant medical records, labs, and imaging. Evelina Monday.  Yen Navarro MD  Staff Urologist  [place] [address]  Office: [phone]

## 2023-07-19 NOTE — ANESTHESIA PROCEDURE NOTES
Airway  Date/Time: 7/19/2023 8:35 AM  Urgency: elective    Airway not difficult    General Information and Staff    Patient location during procedure: OR  Anesthesiologist: Tyra Butler MD  Resident/CRNA: Maggi Zabala CRNA  Performed: CRNA   Performed by: Maggi Zabala CRNA  Authorized by: Tyra Butler MD      Indications and Patient Condition  Indications for airway management: anesthesia  Sedation level: deep  Preoxygenated: yes  Patient position: sniffing  Mask difficulty assessment: 1 - vent by mask    Final Airway Details  Final airway type: endotracheal airway      Successful airway: ETT  Cuffed: yes   Successful intubation technique: direct laryngoscopy  Endotracheal tube insertion site: oral  Blade: Alysa  Blade size: #4  ETT size (mm): 7.5    Cormack-Lehane Classification: grade IIA - partial view of glottis  Placement verified by: capnometry   Measured from: lips  ETT to lips (cm): 22  Number of attempts at approach: 1

## 2023-07-19 NOTE — PLAN OF CARE
NURSING ADMISSION NOTE      Pt arrived from PACU in stable condition         Patient admitted via Cart  Oriented to room. Safety precautions initiated. Bed in low position. Call light in reach.

## 2023-07-19 NOTE — OPERATIVE REPORT
Urology Operative Note    Attending Surgeon: Wes Roth MD    Assistant Surgeon: None    Patient Name: Zelda Dong    Date of Surgery: 7/19/2023    Preoperative Diagnosis: Bladder outlet obstruction due to BPH    Postoperative Diagnosis: Same    Procedure Performed: Cystoscopy, transurethral resection of prostate    Indication:  Patient is a 70year old male with symptomatic bladder outlet obstruction from BPH, refractory to medical therapy. The patient was counseled on options, risks, and benefits and elected to undergo the above procedure. We discussed risks including, but not limited to, bleeding, infection, damage to surrounding structures, need for repeat procedure, urinary retention, erectile dysfunction, retrograde ejaculation, and urinary incontinence. The patient understood these risks and wished to proceed with surgery. Findings:  Trilobar hyperplasia with obstructive appearance - all lobes resected down to capsule. Procedure: The patient was taken to the operating room and a timeout was performed confirming the correct patient and procedure. The patient was prepped and draped in lithotomy position after undergoing general anesthesia. Pre-operative prophylactic antibiotics were given in the form of Cefazolin. The resectoscope was inserted per urethra and the bladder was inspected and drained. The prostate appeared obstructive. Using the bipolar loop, the obstructive lateral and median lobes of the prostate were resected down to the level of the capsule. Bleeding vessels were fulgurated whenever encountered. Care was taken to avoid resection of the ureteral orifices or the verumontanum. After resection was complete, the prostatic fossa appeared wide open. The prostate chips were removed from the bladder using an [facility] evacuator, and sent for specimen. Additional fulguration was performed, and hemostasis was excellent after this. The scope was removed.      A 22-[place] 3-way Sosa catheter was placed into the bladder and 40 mL of sterile water was put into the balloon. This was connected to continuous bladder irrigation. The patient was awoken from anesthesia and transferred to PACU in stable condition. The patient tolerated the procedure well. All instrument/supply counts were correct at the end of the case. Specimens:   Prostate chips    Estimated Blood Loss:  50 mL    Tubes/Drains:  22-[place] 3-way Sosa catheter to CBI    Complications:   None immediate    Condition from OR:  Stable    Plan:   The patient will be admitted to the hospital overnight with continuous bladder irrigation. The CBI will be clamped in the morning and based on the urine color it will be determined if a void trial is appropriate. Neftali Salazar.  Sehryl Live MD  Staff Urologist  [facility]  Office: [phone]

## 2023-07-19 NOTE — PLAN OF CARE
Problem: PAIN - ADULT  Goal: Verbalizes/displays adequate comfort level or patient's stated pain goal  Description: INTERVENTIONS:  - Encourage pt to monitor pain and request assistance  - Assess pain using appropriate pain scale  - Administer analgesics based on type and severity of pain and evaluate response  - Implement non-pharmacological measures as appropriate and evaluate response  - Consider cultural and social influences on pain and pain management  - Manage/alleviate anxiety  - Utilize distraction and/or relaxation techniques  - Monitor for opioid side effects  - Notify MD/LIP if interventions unsuccessful or patient reports new pain  - Anticipate increased pain with activity and pre-medicate as appropriate  7/19/2023 1326 by Twila Callahan RN  Outcome: Progressing  7/19/2023 1326 by Twila Callahan RN  Outcome: Adequate for Discharge     Problem: RISK FOR INFECTION - ADULT  Goal: Absence of fever/infection during anticipated neutropenic period  Description: INTERVENTIONS  - Monitor WBC  - Administer growth factors as ordered  - Implement neutropenic guidelines  7/19/2023 1326 by Twila Callahan RN  Outcome: Progressing  7/19/2023 1326 by Twila Callahan RN  Outcome: Adequate for Discharge     Problem: SAFETY ADULT - FALL  Goal: Free from fall injury  Description: INTERVENTIONS:  - Assess pt frequently for physical needs  - Identify cognitive and physical deficits and behaviors that affect risk of falls.   - [place] fall precautions as indicated by assessment.  - Educate pt/family on patient safety including physical limitations  - Instruct pt to call for assistance with activity based on assessment  - Modify environment to reduce risk of injury  - Provide assistive devices as appropriate  - Consider OT/PT consult to assist with strengthening/mobility  - Encourage toileting schedule  7/19/2023 1326 by Twila Callahan RN  Outcome: Progressing  7/19/2023 1326 by Twila Callahan RN  Outcome: Adequate for Discharge     Problem: DISCHARGE PLANNING  Goal: Discharge to home or other facility with appropriate resources  Description: INTERVENTIONS:  - Identify barriers to discharge w/pt and caregiver  - Include patient/family/discharge partner in discharge planning  - Arrange for needed discharge resources and transportation as appropriate  - Identify discharge learning needs (meds, wound care, etc)  - Arrange for interpreters to assist at discharge as needed  - Consider post-discharge preferences of patient/family/discharge partner  - Complete POLST form as appropriate  - Assess patient's ability to be responsible for managing their own health  - Refer to Case Management Department for coordinating discharge planning if the patient needs post-hospital services based on physician/LIP order or complex needs related to functional status, cognitive ability or social support system  7/19/2023 1326 by Rodrigo Kelly, RN  Outcome: Progressing  7/19/2023 1326 by Rodrigo Kelly, RN  Outcome: Adequate for Discharge

## 2023-07-19 NOTE — DISCHARGE INSTRUCTIONS
You had a transurethral resection of the prostate (TURP) in the operating room. Instructions:    - No heavy lifting or strenuous activity for 4 weeks. You should continue walking and light activity daily.    - Make sure to avoid constipation. I prescribed Miralax for you to take daily at first. You can stop this if you start having loose stools or diarrhea, but the goal is for 1-2 soft bowel movements without straining, every day for the next 4 weeks. - Your follow-up appointment is listed below. Please contact us at [phone] if you need to change your appointment. Your appointments       Date & Time Appointment Department [facility])    Aug 17, 2023  9:15 AM CDT Follow Up Visit with Juan Del Rosario MD [employer], [address] [place], [place] [address]Latisha [facility] Latisha 4)              Breann Golden [address] [address] [place] [address] 21            - You may experience mild pain after the procedure for a few days. If the pain becomes intolerable please contact our office or go to the nearest Emergency Room or Urgent Care. You should take over the counter tylenol for mild pain, and can alternate with ibuprofen (AKA motrin, Advil - provided you do not have a medical condition such as stomach ulcers or kidney disease which prohibits you from taking these) if pain persists. If pain is still not relieved by tylenol and/or ibuprofen, you may take narcotic pain medication if prescribed (typically oxycodone or tramadol). If you are taking narcotic pain medication this can make you constipated, so you should take over the counter stool softeners or miralax if prescribed. - Warm pack or hot baths often help with discomfort after cystoscopy.     - If you take blood thinners (such as aspirin or plavix) please hold these medications until 7 days after surgery.     - You may experience burning and frequency of urination over the next few days. This will improve after a few days if you stay well hydrated. - You are likely to see some blood in your urine (pink or light red urine) that should clear up within a few days. Staying well hydrated should help this clear up. If you notice the urine stays dark red or there are multiple large blood clots despite good hydration, please call the urology clinic ([phone]). - Try to abstain from alcohol, coffee, tea, artificial sweeteners, and spicy food for the next 48 hours as these can irritate the bladder.    - Drink 1.5 to 2 liters of fluid today (water is preferable). If you are on a fluid restriction due to other medical reasons then you need to adhere to your fluid restriction recommendations. - If you are discharged with a Sosa catheter in place, you will be scheduled to return for removal. If you were prescribed Oxybutynin on discharge (to help with bladder spasms), you should stop taking this 24 hours before your follow-up appointment. If the Sosa catheter stops draining, reposition yourself and check for kinks in the tubing. If this persists and you are not seeing any urine drain, and you notice bladder discomfort please call or go to the nearest emergency department as soon as possible. - If you develop fevers / chills, difficulty urinating, or severe abdominal pain please call the office or go to the nearest emergency department. Pedro Jhaveri.  Sherrian Phalen, MD  Staff Urologist  [facility]  Office: [phone]

## 2023-07-20 VITALS
WEIGHT: 158 LBS | RESPIRATION RATE: 18 BRPM | SYSTOLIC BLOOD PRESSURE: 149 MMHG | DIASTOLIC BLOOD PRESSURE: 70 MMHG | TEMPERATURE: 98 F | HEIGHT: 70 IN | OXYGEN SATURATION: 97 % | HEART RATE: 40 BPM | BODY MASS INDEX: 22.62 KG/M2

## 2023-07-20 LAB
ANION GAP SERPL CALC-SCNC: 4 MMOL/L (ref 0–18)
BUN BLD-MCNC: 12 MG/DL (ref 7–18)
CALCIUM BLD-MCNC: 8.8 MG/DL (ref 8.5–10.1)
CHLORIDE SERPL-SCNC: 107 MMOL/L (ref 98–112)
CO2 SERPL-SCNC: 24 MMOL/L (ref 21–32)
CREAT BLD-MCNC: 1.06 MG/DL
ERYTHROCYTE [DISTWIDTH] IN BLOOD BY AUTOMATED COUNT: 12.2 %
GFR SERPLBLD BASED ON 1.73 SQ M-ARVRAT: 78 ML/MIN/1.73M2 (ref 60–?)
GLUCOSE BLD-MCNC: 110 MG/DL (ref 70–99)
HCT VFR BLD AUTO: 38.3 %
HGB BLD-MCNC: 13.3 G/DL
MCH RBC QN AUTO: 30.3 PG (ref 26–34)
MCHC RBC AUTO-ENTMCNC: 34.7 G/DL (ref 31–37)
MCV RBC AUTO: 87.2 FL
OSMOLALITY SERPL CALC.SUM OF ELEC: 280 MOSM/KG (ref 275–295)
PLATELET # BLD AUTO: 212 10(3)UL (ref 150–450)
POTASSIUM SERPL-SCNC: 3.9 MMOL/L (ref 3.5–5.1)
RBC # BLD AUTO: 4.39 X10(6)UL
SODIUM SERPL-SCNC: 135 MMOL/L (ref 136–145)
WBC # BLD AUTO: 16.8 X10(3) UL (ref 4–11)

## 2023-07-20 PROCEDURE — 99214 OFFICE O/P EST MOD 30 MIN: CPT | Performed by: STUDENT IN AN ORGANIZED HEALTH CARE EDUCATION/TRAINING PROGRAM

## 2023-07-20 NOTE — DISCHARGE SUMMARY
[facility]    Discharge Summary    Tete Chowdary Patient Status:  Outpatient in a Bed    6/15/1960 MRN [id number]   [facility] 3NW-A Attending Zach Joy MD   Hosp Day # 0 PCP Yvonne Ramsey DO     Date of Admission: 2023     Date of Discharge: No discharge date for patient encounter. Disposition: Home or Self Care     Admitting Diagnosis: Urinary retention [R33.9]  Pre-op testing    Discharge Diagnosis: Patient Active Problem List:     Grade III internal hemorrhoids     Hx of colonic polyps     Trigger finger, left ring finger     Eczema of external ear, bilateral     Abnormal auditory perception, unspecified laterality     Polyp of colon     Diverticulosis     Enlarged prostate on rectal examination     Pre-op testing     Benign prostatic hyperplasia with urinary obstruction     Primary hypertension     Gastroesophageal reflux disease without esophagitis     Vertigo    Reason for Admission:  You came to the hospital for a transurethral resection of the prostate (TURP) in the operating room. Physical Exam:    CONSTITUTIONAL: Well developed, well nourished, in no acute distress  NEUROLOGIC: Alert and oriented  HEAD: Normocephalic, atraumatic  EYES: Sclera non-icteric  ENT: Hearing intact, moist mucous membranes  NECK: No obvious goiter or masses  RESPIRATORY: Normal respiratory effort  SKIN: No evident rashes  ABDOMEN: Soft, non-tender, non-distended  GENITOURINARY: Sosa draining yellow urine - removed prior to discharge and voiding    History of Present Illness:   Wilfred Reeves is a 70year old male with history of bladder outlet obstruction due to BPH, refractory to medical therapy, here for TURP. Hospital Course:  Patient underwent an uncomplicated TURP, and was admitted to the floor pos-operatively. Continuous bladder irrigation was run overnight, and then was clamped in the morning.      After the urine remained yellow without clots and draining well, the Sosa was removed and he passed a void trial with low post-void residual bladder scans. By time of discharge, the patient was ambulating and tolerating a regular diet without issue. Pain was well controlled with oral pain medications. Consultations: None    Procedures: Transurethral resection of the prostate (TURP)    Complications: None    Discharge Condition: Good         Discharge Medications:      Discharge Medications        CONTINUE taking these medications        Instructions Prescription details   amLODIPine Besy-Benazepril HCl 10-40 MG Caps  Commonly known as: LOTREL       Refills: 0     betamethasone dipropionate 0.05 % Crea  Commonly known as: Diprosone      Apply to affected areas of the skin of body nightly for 2 weeks as directed, take 1 week break, repeat cycle as needed   Quantity: 45 g  Refills: 0     clobetasol 0.05 % Soln  Commonly known as: Temovate      Apply 1 mL topically nightly. Apply nightly to scalp   Quantity: 60 each  Refills: 1     meclizine 25 MG Tabs  Commonly known as: Antivert      Take 1 tablet (25 mg total) by mouth 3 (three) times daily as needed for Dizziness. Refills: 0     omeprazole 20 MG Cpdr  Commonly known as: PriLOSEC      Take 1 capsule (20 mg total) by mouth every morning before breakfast.   Refills: 0     sulfamethoxazole-trimethoprim -160 MG Tabs per tablet  Commonly known as: Bactrim DS      Take 1 tablet by mouth 2 (two) times daily for 9 days. Stop taking on: July 21, 2023  Quantity: 18 tablet  Refills: 0     tamsulosin 0.4 MG Caps  Commonly known as: Flomax      Take 1 capsule (0.4 mg total) by mouth in the morning and 1 capsule (0.4 mg total) before bedtime. Quantity: 180 capsule  Refills: 6     timolol 0.5 % Soln  Commonly known as: Timoptic      Place 1 drop into both eyes daily.    Refills: 0     triamcinolone 0.1 % Oint  Commonly known as: Kenalog      APPLY TO THE AFFECTED AREA TWICE DAILY FOR 2-3 WEEKS UNTIL RESOLVED   Refills: 0 Follow up Visits:     Your appointments       Date & Time Appointment Department [facility])    Aug 17, 2023  9:15 AM CDT Follow Up Visit with MD Sandy Mahoney, [facility] Frances 4)              Juanita Golden [address]              Follow up Labs: None    Discharge Instructions: You had a transurethral resection of the prostate (TURP) in the operating room. Instructions:    - No heavy lifting or strenuous activity for 4 weeks. You should continue walking and light activity daily.    - Make sure to avoid constipation. I prescribed Miralax for you to take daily at first. You can stop this if you start having loose stools or diarrhea, but the goal is for 1-2 soft bowel movements without straining, every day for the next 4 weeks. - Your follow-up appointment is listed below. Please contact us at [phone] if you need to change your appointment. Your appointments       Date & Time Appointment Department [facility])    Aug 17, 2023  9:15 AM CDT Follow Up Visit with MD Sandy Mahoney, [facility] Latisha [facility] Latisha 4)              Juanita Golden [address] 21            - You may experience mild pain after the procedure for a few days. If the pain becomes intolerable please contact our office or go to the nearest Emergency Room or Urgent Care. You should take over the counter tylenol for mild pain, and can alternate with ibuprofen (AKA motrin, Advil - provided you do not have a medical condition such as stomach ulcers or kidney disease which prohibits you from taking these) if pain persists.  If pain is still not relieved by tylenol and/or ibuprofen, you may take narcotic pain medication if prescribed (typically oxycodone or tramadol). If you are taking narcotic pain medication this can make you constipated, so you should take over the counter stool softeners or miralax if prescribed. - Warm pack or hot baths often help with discomfort after cystoscopy. - If you take blood thinners (such as aspirin or plavix) please hold these medications until 7 days after surgery.     - You may experience burning and frequency of urination over the next few days. This will improve after a few days if you stay well hydrated. - You are likely to see some blood in your urine (pink or light red urine) that should clear up within a few days. Staying well hydrated should help this clear up. If you notice the urine stays dark red or there are multiple large blood clots despite good hydration, please call the urology clinic ([phone]). - Try to abstain from alcohol, coffee, tea, artificial sweeteners, and spicy food for the next 48 hours as these can irritate the bladder.    - Drink 1.5 to 2 liters of fluid today (water is preferable). If you are on a fluid restriction due to other medical reasons then you need to adhere to your fluid restriction recommendations. - If you are discharged with a Sosa catheter in place, you will be scheduled to return for removal. If you were prescribed Oxybutynin on discharge (to help with bladder spasms), you should stop taking this 24 hours before your follow-up appointment. If the Sosa catheter stops draining, reposition yourself and check for kinks in the tubing. If this persists and you are not seeing any urine drain, and you notice bladder discomfort please call or go to the nearest emergency department as soon as possible. - If you develop fevers / chills, difficulty urinating, or severe abdominal pain please call the office or go to the nearest emergency department. Walt Marte.  Nilson Goins MD  Staff Urologist  [facility]  Office: [phone]

## 2023-07-20 NOTE — PLAN OF CARE
NURSING DISCHARGE NOTE    Discharge instructions given, All questions answered to pt and family satisfaction. IV removed and intact. Discharged Home via Ambulatory. Accompanied by Family member and Support staff  Belongings Taken by patient/family.

## 2023-07-20 NOTE — PLAN OF CARE
Problem: PAIN - ADULT  Goal: Verbalizes/displays adequate comfort level or patient's stated pain goal  Description: INTERVENTIONS:  - Encourage pt to monitor pain and request assistance  - Assess pain using appropriate pain scale  - Administer analgesics based on type and severity of pain and evaluate response  - Implement non-pharmacological measures as appropriate and evaluate response  - Consider cultural and social influences on pain and pain management  - Manage/alleviate anxiety  - Utilize distraction and/or relaxation techniques  - Monitor for opioid side effects  - Notify MD/LIP if interventions unsuccessful or patient reports new pain  - Anticipate increased pain with activity and pre-medicate as appropriate  Outcome: Progressing     Problem: RISK FOR INFECTION - ADULT  Goal: Absence of fever/infection during anticipated neutropenic period  Description: INTERVENTIONS  - Monitor WBC  - Administer growth factors as ordered  - Implement neutropenic guidelines  Outcome: Progressing     Problem: SAFETY ADULT - FALL  Goal: Free from fall injury  Description: INTERVENTIONS:  - Assess pt frequently for physical needs  - Identify cognitive and physical deficits and behaviors that affect risk of falls.   - [place] fall precautions as indicated by assessment.  - Educate pt/family on patient safety including physical limitations  - Instruct pt to call for assistance with activity based on assessment  - Modify environment to reduce risk of injury  - Provide assistive devices as appropriate  - Consider OT/PT consult to assist with strengthening/mobility  - Encourage toileting schedule  Outcome: Progressing     Problem: DISCHARGE PLANNING  Goal: Discharge to home or other facility with appropriate resources  Description: INTERVENTIONS:  - Identify barriers to discharge w/pt and caregiver  - Include patient/family/discharge partner in discharge planning  - Arrange for needed discharge resources and transportation as appropriate  - Identify discharge learning needs (meds, wound care, etc)  - Arrange for interpreters to assist at discharge as needed  - Consider post-discharge preferences of patient/family/discharge partner  - Complete POLST form as appropriate  - Assess patient's ability to be responsible for managing their own health  - Refer to Case Management Department for coordinating discharge planning if the patient needs post-hospital services based on physician/LIP order or complex needs related to functional status, cognitive ability or social support system  Outcome: Progressing

## 2023-07-20 NOTE — PLAN OF CARE
Pt is alert and oriented x4. VSS. Maintaining o2 sats on r/a. SCD's for dvt prophylaxis. CBI in place running slow, clear yellow urine noted, no clots. Occasional mild pain noted per pt, scheduled medication given w/ relief. Pt updated w/ poc. All questions and concerns addressed at this time.

## 2023-07-24 ENCOUNTER — PATIENT MESSAGE (OUTPATIENT)
Dept: SURGERY | Facility: CLINIC | Age: 63
End: 2023-07-24

## 2023-07-25 ENCOUNTER — TELEPHONE (OUTPATIENT)
Dept: SURGERY | Facility: CLINIC | Age: 63
End: 2023-07-25

## 2023-07-25 NOTE — TELEPHONE ENCOUNTER
----- [facility] Olman Ruvalcaba sent at 7/24/2023  1:52 PM CDT -----  Regarding: Irritation/burning sensation passing urine  Contact: [phone]  I am still having irritation and burning sensation while passing urine. It is just the penis area that burns. I have seen few foreign objects in the urine, they dont look like clot to me but not sure. Other than that i dont see any issue so far. Pl advice.   Wilfred

## 2023-07-25 NOTE — TELEPHONE ENCOUNTER
This encounter is now closed.        RN already replied via [facility] [address]

## 2023-08-09 ENCOUNTER — TELEPHONE (OUTPATIENT)
Dept: SURGERY | Facility: CLINIC | Age: 63
End: 2023-08-09

## 2023-08-09 NOTE — TELEPHONE ENCOUNTER
Spoke with patient. Having mild bleeding still a few weeks after TURP. No other symptoms. Blood is present at the start of his stream but clears shortly after. No constipation. Encouraged continued good hydration and discussed warning signs of inability to void or large blood clots and dark red urine.

## 2023-08-17 ENCOUNTER — OFFICE VISIT (OUTPATIENT)
Dept: SURGERY | Facility: CLINIC | Age: 63
End: 2023-08-17

## 2023-08-17 DIAGNOSIS — N32.81 OAB (OVERACTIVE BLADDER): ICD-10-CM

## 2023-08-17 DIAGNOSIS — R82.90 URINE FINDING: ICD-10-CM

## 2023-08-17 DIAGNOSIS — N40.1 BPH WITH OBSTRUCTION/LOWER URINARY TRACT SYMPTOMS: Primary | ICD-10-CM

## 2023-08-17 DIAGNOSIS — N13.8 BPH WITH OBSTRUCTION/LOWER URINARY TRACT SYMPTOMS: Primary | ICD-10-CM

## 2023-08-17 LAB
APPEARANCE: CLEAR
BILIRUBIN: NEGATIVE
GLUCOSE (URINE DIPSTICK): NEGATIVE MG/DL
KETONES (URINE DIPSTICK): NEGATIVE MG/DL
MULTISTIX LOT#: ABNORMAL NUMERIC
NITRITE, URINE: NEGATIVE
PH, URINE: 5.5 (ref 4.5–8)
PROTEIN (URINE DIPSTICK): 100 MG/DL
SPECIFIC GRAVITY: 1.02 (ref 1–1.03)
URINE-COLOR: YELLOW
UROBILINOGEN,SEMI-QN: 0.2 MG/DL (ref 0–1.9)

## 2023-08-17 PROCEDURE — 51798 US URINE CAPACITY MEASURE: CPT | Performed by: SURGERY

## 2023-08-17 PROCEDURE — 99024 POSTOP FOLLOW-UP VISIT: CPT | Performed by: SURGERY

## 2023-08-17 PROCEDURE — 81002 URINALYSIS NONAUTO W/O SCOPE: CPT | Performed by: SURGERY

## 2023-08-17 RX ORDER — OXYBUTYNIN CHLORIDE 10 MG/1
10 TABLET, EXTENDED RELEASE ORAL DAILY
Qty: 90 TABLET | Refills: 6 | Status: SHIPPED | OUTPATIENT
Start: 2023-08-17

## 2023-08-17 NOTE — PROGRESS NOTES
Urology Clinic Note    Primary Care Provider:  Luis Miguel Melo DO     Chief Complaint:   Follow-up after TURP     HPI:   Montana Lopez is a 70year old male with history of GERD, hypertension, hyperlipidemia, vertigo, BPH/LUTS on tamsulosin, elevated PSA status post biopsy by Dr. Janet Martin on 1/7/2021. He has been followed by [facility] urology. Of note, he did have a prostate MRI on 9/16/2019 that showed a 63 g prostate, PI-RADS 2 study. Prostate biopsy pathology showed no malignancy, only focal chronic inflammation. He had a recent episode of urinary retention requiring Sosa catheter placement. On 2/14/23 he passed a void trial with low PVR and I taught him CIC. Cystoscopy showed trilobar hyperplasia and obstructive appearance of an enlarged prostate with intravesical protrusion. He has not been taking the finasteride and things have been okay on the tamsulosin alone. I brought him to the OR on 7/19/2023 for TURP. He did well and was discharged on postoperative day 1 after passing a void trial.  Surgical pathology showed a 37 g of resected benign prostate tissue. He is doing well after surgery. He feels his urinary stream is significantly improved. He does note some urinary urgency and occasional urge incontinence. He denies stress incontinence. No further gross hematuria.       Post-void residual bladder scan: 14 mL    Urinalysis: Large blood, large leukocyte esterase    PSA:  Lab Results   Component Value Date    PSA 10.40 (H) 06/04/2021    PSA 4.19 (H) 08/15/2019        History:     Past Medical History:   Diagnosis Date    BPH (benign prostatic hyperplasia)     Esophageal reflux     Essential hypertension     Glaucoma     Hearing impairment     Tinnitis    High blood pressure     High cholesterol     Hyperlipidemia     Vertigo     Vertigo     Visual impairment     glasses       Past Surgical History:   Procedure Laterality Date    COLONOSCOPY      COLONOSCOPY N/A 12/20/2022    Procedure: COLONOSCOPY;  Surgeon: Echo Armenta MD;  Location: [facility] ENDOSCOPY    HAND/FINGER SURGERY UNLISTED Left 10/23/2019    left trigger finger    HERNIA SURGERY Bilateral 2022    OTHER SURGICAL HISTORY  2021    Prostate Biopsy Dr. Valentin Segovia ENDOSCOPY,EXAM         No family history on file. Social History     Socioeconomic History    Marital status:    Tobacco Use    Smoking status: Every Day     Packs/day: 0.00     Types: Cigarettes     Start date: 1975     Last attempt to quit: 2005     Years since quittin.3    Smokeless tobacco: Never   Vaping Use    Vaping Use: Never used   Substance and Sexual Activity    Alcohol use: Yes     Alcohol/week: 6.0 standard drinks of alcohol     Types: 6 Shots of liquor per week    Drug use: Never       Medications (Active prior to today's visit):  Current Outpatient Medications   Medication Sig Dispense Refill    timolol 0.5 % Ophthalmic Solution Place 1 drop into both eyes daily. tamsulosin 0.4 MG Oral Cap Take 1 capsule (0.4 mg total) by mouth in the morning and 1 capsule (0.4 mg total) before bedtime. 180 capsule 6    omeprazole 20 MG Oral Capsule Delayed Release Take 1 capsule (20 mg total) by mouth every morning before breakfast.      betamethasone dipropionate 0.05 % External Cream Apply to affected areas of the skin of body nightly for 2 weeks as directed, take 1 week break, repeat cycle as needed 45 g 0    Clobetasol Propionate 0.05 % External Solution Apply 1 mL topically nightly. Apply nightly to scalp 60 each 1    amLODIPine Besy-Benazepril HCl 10-40 MG Oral Cap       Meclizine HCl 25 MG Oral Tab Take 1 tablet (25 mg total) by mouth 3 (three) times daily as needed for Dizziness. triamcinolone acetonide 0.1 % External Ointment APPLY TO THE AFFECTED AREA TWICE DAILY FOR 2-3 WEEKS UNTIL RESOLVED         Allergies:  No Known Allergies    Review of Systems:   A comprehensive 10-point review of systems was completed.   Pertinent positives and negatives are noted in the the HPI. Physical Exam:   CONSTITUTIONAL: Well developed, well nourished, in no acute distress  NEUROLOGIC: Alert and oriented  HEAD: Normocephalic, atraumatic  EYES: Sclera non-icteric  ENT: Hearing intact, moist mucous membranes  NECK: No obvious goiter or masses  RESPIRATORY: Normal respiratory effort  SKIN: No evident rashes  ABDOMEN: Soft, non-tender, non-distended    Assessment & Plan:   Clive Kumari is a 70year old male with history of GERD, hypertension, hyperlipidemia, vertigo, BPH/LUTS on tamsulosin, elevated PSA status post biopsy by Dr. Pablo Kenney on 1/7/2021. He has been followed by [facility] urology. Of note, he did have a prostate MRI on 9/16/2019 that showed a 63 g prostate, PI-RADS 2 study. Prostate biopsy pathology showed no malignancy, only focal chronic inflammation. He had a recent episode of urinary retention requiring Sosa catheter placement. On 2/14/23 he passed a void trial with low PVR and I taught him CIC. Cystoscopy showed trilobar hyperplasia and obstructive appearance of an enlarged prostate with intravesical protrusion. He has not been taking the finasteride and things have been okay on the tamsulosin alone. I brought him to the OR on 7/19/2023 for TURP. He did well and was discharged on postoperative day 1 after passing a void trial.  Surgical pathology showed a 37 g of resected benign prostate tissue. He is doing well after surgery. He feels his urinary stream is significantly improved. He does note some urinary urgency and occasional urge incontinence. He denies stress incontinence.   No further gross hematuria.      -Okay to stop tamsulosin 6 weeks after surgery  -Start oxybutynin 10 mg extended release for possible OAB that I explained should resolve after a few more weeks from surgery  -Follow-up urine culture  -Return to clinic in 3 months or sooner as needed    In total, 30 minutes were spent on this patient encounter (including chart review, patient history, physical, and counseling, documentation, and communication). Moni Dupree.  Aarti Young MD  Staff Urologist  [place] [address]  Office: [phone]

## 2023-08-21 ENCOUNTER — PATIENT MESSAGE (OUTPATIENT)
Dept: SURGERY | Facility: CLINIC | Age: 63
End: 2023-08-21

## 2023-08-21 NOTE — PROGRESS NOTES
Albin Hardin,    I have reviewed your urine test results. Tests show no significant abnormalities (no signs of infection in the urine). No changes to the plan as we had previously discussed. Please let me know if you have any questions.     Thanks,  Amanda Erickson MD

## 2023-08-21 NOTE — TELEPHONE ENCOUNTER
Note, patient was last seen on 8/17/23 by Dr Ross Gosselin:     -Hernandez Ran to stop tamsulosin 6 weeks after surgery  -Start oxybutynin 10 mg extended release for possible OAB that I explained should resolve after a few more weeks from surgery  -Follow-up urine culture  -Return to clinic in 3 months or sooner as needed    \"I have reviewed your urine test results. Tests show no significant abnormalities (no signs of infection in the urine). No changes to the plan as we had previously discussed. Please let me know if you have any questions.  \"

## 2023-09-15 ENCOUNTER — PATIENT MESSAGE (OUTPATIENT)
Dept: SURGERY | Facility: CLINIC | Age: 63
End: 2023-09-15

## 2023-11-17 ENCOUNTER — OFFICE VISIT (OUTPATIENT)
Dept: SURGERY | Facility: CLINIC | Age: 63
End: 2023-11-17
Payer: COMMERCIAL

## 2023-11-17 DIAGNOSIS — R82.90 URINE FINDING: Primary | ICD-10-CM

## 2023-11-17 LAB
APPEARANCE: CLEAR
BILIRUBIN: NEGATIVE
GLUCOSE (URINE DIPSTICK): NEGATIVE MG/DL
KETONES (URINE DIPSTICK): NEGATIVE MG/DL
LEUKOCYTES: NEGATIVE
MULTISTIX LOT#: ABNORMAL NUMERIC
NITRITE, URINE: NEGATIVE
PH, URINE: 6 (ref 4.5–8)
PROTEIN (URINE DIPSTICK): NEGATIVE MG/DL
SPECIFIC GRAVITY: 1.02 (ref 1–1.03)
URINE-COLOR: YELLOW
UROBILINOGEN,SEMI-QN: 0.2 MG/DL (ref 0–1.9)

## 2023-11-17 PROCEDURE — 99213 OFFICE O/P EST LOW 20 MIN: CPT | Performed by: SURGERY

## 2023-11-17 PROCEDURE — 81002 URINALYSIS NONAUTO W/O SCOPE: CPT | Performed by: SURGERY

## 2023-11-17 NOTE — PROGRESS NOTES
Urology Clinic Note    Primary Care Provider:  Amy Mendes DO     Chief Complaint:   Follow-up after TURP      HPI:   Efren Dolan is a 70year old male with history of GERD, hypertension, hyperlipidemia, vertigo, BPH/LUTS on tamsulosin, elevated PSA. He has been followed by [facility] urology. Of note, he did have a prostate MRI on 9/16/2019 that showed a 63 g prostate, PI-RADS 2 study. Prostate biopsy in 2021 showed no malignancy, only focal chronic inflammation. He had a recent episode of urinary retention requiring Sosa catheter placement. On 2/14/23 he passed a void trial with low PVR and I taught him CIC. Cystoscopy showed trilobar hyperplasia and obstructive appearance of an enlarged prostate with intravesical protrusion. I brought him to the OR on 7/19/2023 for TURP. He did well and was discharged on postoperative day 1 after passing a void trial.  Surgical pathology showed a 37 g of resected benign prostate tissue. I saw him last on 8/17/2023 and at that time he was doing well. His urine stream was much improved. He did note some urinary urgency and urge incontinence but no stress incontinence. I started him on oxybutynin. Today he is doing well. He feels his urinary stream is strong after going off tamsulosin. His urinary urgency and frequency are significantly improved on oxybutynin. He is not having any side effects with the medication. He still notices occasional urge to void when walking his dog after about 1 hour, but this may be related to fluid intake, caffeine/alcohol intake prior. AUA symptom score is 3/pleased with LUTS.     Urinalysis: Small blood, otherwise negative    PSA:  Lab Results   Component Value Date    PSA 10.40 (H) 06/04/2021    PSA 4.19 (H) 08/15/2019        History:     Past Medical History:   Diagnosis Date    BPH (benign prostatic hyperplasia)     Esophageal reflux     Essential hypertension     Glaucoma     Hearing impairment     Tinnitis High blood pressure     High cholesterol     Hyperlipidemia     Vertigo     Vertigo     Visual impairment     glasses       Past Surgical History:   Procedure Laterality Date    COLONOSCOPY      COLONOSCOPY N/A 2022    Procedure: COLONOSCOPY;  Surgeon: Kamala Jorge MD;  Location: [address] [place] [address] ENDOSCOPY    HAND/FINGER SURGERY UNLISTED Left 10/23/2019    left trigger finger    HERNIA SURGERY Bilateral 2022    OTHER SURGICAL HISTORY  2021    Prostate Biopsy Dr. Bcekie Multani ENDOSCOPY,EXAM         No family history on file. Social History     Socioeconomic History    Marital status:    Tobacco Use    Smoking status: Every Day     Packs/day: 0     Types: Cigarettes     Start date: 1975     Last attempt to quit: 2005     Years since quittin.5    Smokeless tobacco: Never   Vaping Use    Vaping Use: Never used   Substance and Sexual Activity    Alcohol use: Yes     Alcohol/week: 6.0 standard drinks of alcohol     Types: 6 Shots of liquor per week    Drug use: Never       Medications (Active prior to today's visit):  Current Outpatient Medications   Medication Sig Dispense Refill    oxybutynin ER 10 MG Oral Tablet 24 Hr Take 1 tablet (10 mg total) by mouth daily. 90 tablet 6    timolol 0.5 % Ophthalmic Solution Place 1 drop into both eyes daily. tamsulosin 0.4 MG Oral Cap Take 1 capsule (0.4 mg total) by mouth in the morning and 1 capsule (0.4 mg total) before bedtime. 180 capsule 6    omeprazole 20 MG Oral Capsule Delayed Release Take 1 capsule (20 mg total) by mouth every morning before breakfast.      betamethasone dipropionate 0.05 % External Cream Apply to affected areas of the skin of body nightly for 2 weeks as directed, take 1 week break, repeat cycle as needed 45 g 0    Clobetasol Propionate 0.05 % External Solution Apply 1 mL topically nightly.  Apply nightly to scalp 60 each 1    amLODIPine Besy-Benazepril HCl 10-40 MG Oral Cap       Meclizine HCl 25 MG Oral Tab Take 1 tablet (25 mg total) by mouth 3 (three) times daily as needed for Dizziness. triamcinolone acetonide 0.1 % External Ointment APPLY TO THE AFFECTED AREA TWICE DAILY FOR 2-3 WEEKS UNTIL RESOLVED         Allergies:  No Known Allergies    Review of Systems:   A comprehensive 10-point review of systems was completed. Pertinent positives and negatives are noted in the the HPI. Physical Exam:   CONSTITUTIONAL: Well developed, well nourished, in no acute distress  NEUROLOGIC: Alert and oriented  HEAD: Normocephalic, atraumatic  EYES: Sclera non-icteric  ENT: Hearing intact, moist mucous membranes  NECK: No obvious goiter or masses  RESPIRATORY: Normal respiratory effort  SKIN: No evident rashes  ABDOMEN: Soft, non-tender, non-distended    Assessment & Plan:   Teri Riggs is a 70year old male with history of GERD, hypertension, hyperlipidemia, vertigo, BPH/LUTS on tamsulosin, elevated PSA. He has been followed by [facility] urology. Of note, he did have a prostate MRI on 9/16/2019 that showed a 63 g prostate, PI-RADS 2 study. Prostate biopsy in 2021 showed no malignancy, only focal chronic inflammation. He had a recent episode of urinary retention requiring Sosa catheter placement. On 2/14/23 he passed a void trial with low PVR and I taught him CIC. Cystoscopy showed trilobar hyperplasia and obstructive appearance of an enlarged prostate with intravesical protrusion. I brought him to the OR on 7/19/2023 for TURP. He did well and was discharged on postoperative day 1 after passing a void trial.  Surgical pathology showed a 37 g of resected benign prostate tissue. I saw him last on 8/17/2023 and at that time he was doing well. His urine stream was much improved. He did note some urinary urgency and urge incontinence but no stress incontinence. I started him on oxybutynin. Today he is doing well. He feels his urinary stream is strong after going off tamsulosin.   His urinary urgency and frequency are significantly improved on oxybutynin. He is not having any side effects with the medication. He still notices occasional urge to void when walking his dog after about 1 hour, but this may be related to fluid intake, caffeine/alcohol intake prior.    -Continue oxybutynin 10 mg extended release daily  -Okay to trial a few days off of the medication to see if he needs it and can stop if minimal difference  -Return to clinic in 1 year or sooner as needed    In total, 20 minutes were spent on this patient encounter (including chart review, patient history, physical, and counseling, documentation, and communication). Carlo Hernandez.  Farrukh Rock MD  Staff Urologist  [facility]  Office: [phone]

## 2024-01-23 ENCOUNTER — OFFICE VISIT (OUTPATIENT)
Facility: LOCATION | Age: 64
End: 2024-01-23
Payer: COMMERCIAL

## 2024-01-23 ENCOUNTER — NURSE ONLY (OUTPATIENT)
Facility: LOCATION | Age: 64
End: 2024-01-23
Payer: COMMERCIAL

## 2024-01-23 DIAGNOSIS — H93.293 ABNORMAL AUDITORY PERCEPTION OF BOTH EARS: Primary | ICD-10-CM

## 2024-01-23 DIAGNOSIS — H93.19 TINNITUS, UNSPECIFIED LATERALITY: ICD-10-CM

## 2024-01-23 DIAGNOSIS — H61.23 BILATERAL IMPACTED CERUMEN: Primary | ICD-10-CM

## 2024-01-23 DIAGNOSIS — H90.3 SENSORY HEARING LOSS, BILATERAL: ICD-10-CM

## 2024-01-23 PROCEDURE — 99213 OFFICE O/P EST LOW 20 MIN: CPT | Performed by: OTOLARYNGOLOGY

## 2024-01-23 PROCEDURE — 92557 COMPREHENSIVE HEARING TEST: CPT | Performed by: AUDIOLOGIST

## 2024-01-23 PROCEDURE — 92567 TYMPANOMETRY: CPT | Performed by: AUDIOLOGIST

## 2024-01-23 NOTE — PROGRESS NOTES
Wilfred Paula was seen for an audiometric evaluation and tympanogram today. Referred back to physician. Consider trial use with amplification pending medical clearance.      Cathie Rolle M.A. [facility]

## 2024-01-23 NOTE — PROGRESS NOTES
Wilfred Paula is a 63 year old male.   Chief Complaint   Patient presents with    Cerumen Impaction     HPI:   The patient was last seen approximately year ago for cerumen impaction.  He feels the ears are plugged at present.  Denies fevers chills nausea or vomiting.  Does complain of bilateral tinnitus.  No other new complaints.  Current Outpatient Medications   Medication Sig Dispense Refill    oxybutynin ER 10 MG Oral Tablet 24 Hr Take 1 tablet (10 mg total) by mouth daily. 90 tablet 6    timolol 0.5 % Ophthalmic Solution Place 1 drop into both eyes daily.      tamsulosin 0.4 MG Oral Cap Take 1 capsule (0.4 mg total) by mouth in the morning and 1 capsule (0.4 mg total) before bedtime. (Patient not taking: Reported on 11/17/2023) 180 capsule 6    omeprazole 20 MG Oral Capsule Delayed Release Take 1 capsule (20 mg total) by mouth every morning before breakfast.      betamethasone dipropionate 0.05 % External Cream Apply to affected areas of the skin of body nightly for 2 weeks as directed, take 1 week break, repeat cycle as needed 45 g 0    Clobetasol Propionate 0.05 % External Solution Apply 1 mL topically nightly. Apply nightly to scalp 60 each 1    amLODIPine Besy-Benazepril HCl 10-40 MG Oral Cap       Meclizine HCl 25 MG Oral Tab Take 1 tablet (25 mg total) by mouth 3 (three) times daily as needed for Dizziness.      triamcinolone acetonide 0.1 % External Ointment APPLY TO THE AFFECTED AREA TWICE DAILY FOR 2-3 WEEKS UNTIL RESOLVED        Past Medical History:   Diagnosis Date    BPH (benign prostatic hyperplasia)     Esophageal reflux     Essential hypertension     Glaucoma     Hearing impairment     Tinnitis    High blood pressure     High cholesterol     Hyperlipidemia     Vertigo     Vertigo     Visual impairment     glasses      Social History:  Social History     Socioeconomic History    Marital status:    Tobacco Use    Smoking status: Every Day     Packs/day: 0     Types: Cigarettes     Start  date: 1975     Last attempt to quit: 2005     Years since quittin.7    Smokeless tobacco: Never   Vaping Use    Vaping Use: Never used   Substance and Sexual Activity    Alcohol use: Yes     Alcohol/week: 6.0 standard drinks of alcohol     Types: 6 Shots of liquor per week    Drug use: Never      Past Surgical History:   Procedure Laterality Date    COLONOSCOPY      COLONOSCOPY N/A 2022    Procedure: COLONOSCOPY;  Surgeon: Anselmo Lyon MD;  Location:  ENDOSCOPY    HAND/FINGER SURGERY UNLISTED Left 10/23/2019    left trigger finger    HERNIA SURGERY Bilateral 2022    OTHER SURGICAL HISTORY  2021    Prostate Biopsy Dr. Loo    UPPER GI ENDOSCOPY,EXAM           REVIEW OF SYSTEMS:   GENERAL HEALTH: feels well otherwise  GENERAL : denies fever, chills, sweats, weight loss, weight gain  SKIN: denies any unusual skin lesions or rashes  RESPIRATORY: denies shortness of breath with exertion  NEURO: denies headaches    EXAM:   There were no vitals taken for this visit.  System Findings Details   Skin Normal Inspection - Normal.   Constitutional Normal Overall appearance - Normal.   Head/Face Normal Facial features - Normal. Eyebrows - Normal. Skull - Normal.   Oral/Oropharynx Normal Lips - Normal, Tonsils - Normal, Tongue - Normal    Nasal Normal External nose - Normal. Nasal septum - Normal, Turbinates - Normal   Neurological Normal Memory - Normal. Cranial nerves - Cranial nerves II through XII grossly intact.   Neck Exam Normal Inspection - Normal. Palpation - Normal. Parotid gland - Normal. Thyroid gland - Normal.   Psychiatric Normal Orientation - Oriented to time, place, person & situation. Appropriate mood and affect.   Lymph Detail Normal Submental. Submandibular. Anterior cervical. Posterior cervical. Supraclavicular.   Eyes Normal Conjunctiva - Right: Normal, Left: Normal. Pupil - Right: Normal, Left: Normal.    Ears Normal Inspection - Right: Normal, Left: Normal. Canal -  Left: Normal.  Side shows minimal wax removed by suction.  TM - Right: Normal, Left: Normal.     Audiogram shows sloping moderate hearing loss bilateral which appears symmetric.        ASSESSMENT AND PLAN:   1. Bilateral impacted cerumen  Problem is resolved.  He will return when plugged.    2. Sensory hearing loss, bilateral  At some point he may think about amplification.  Also recommend he consider Lipoflavonoid for the ringing.      The patient indicates understanding of these issues and agrees to the plan.      Keven Sneed MD  1/23/2024  1:59 PM

## 2024-12-31 ENCOUNTER — HOSPITAL ENCOUNTER (OUTPATIENT)
Dept: GENERAL RADIOLOGY | Age: 64
Discharge: HOME | End: 2024-12-31
Attending: CHIROPRACTOR
Payer: COMMERCIAL

## 2024-12-31 DIAGNOSIS — M54.2 CERVICALGIA: ICD-10-CM

## 2024-12-31 PROCEDURE — 72050 X-RAY EXAM NECK SPINE 4/5VWS: CPT | Performed by: CHIROPRACTOR

## 2025-06-02 ENCOUNTER — OFFICE VISIT (OUTPATIENT)
Facility: LOCATION | Age: 65
End: 2025-06-02
Payer: COMMERCIAL

## 2025-06-02 DIAGNOSIS — E04.2 MULTIPLE THYROID NODULES: ICD-10-CM

## 2025-06-02 DIAGNOSIS — L29.9 EAR ITCHING: ICD-10-CM

## 2025-06-02 DIAGNOSIS — H90.3 SENSORINEURAL HEARING LOSS (SNHL) OF BOTH EARS: Primary | ICD-10-CM

## 2025-06-02 DIAGNOSIS — H93.13 TINNITUS OF BOTH EARS: ICD-10-CM

## 2025-06-02 PROCEDURE — 92557 COMPREHENSIVE HEARING TEST: CPT | Performed by: AUDIOLOGIST

## 2025-06-02 PROCEDURE — 99214 OFFICE O/P EST MOD 30 MIN: CPT | Performed by: OTOLARYNGOLOGY

## 2025-06-02 PROCEDURE — 92567 TYMPANOMETRY: CPT | Performed by: AUDIOLOGIST

## 2025-06-02 NOTE — PROGRESS NOTES
Wilfred Paula was seen for an audiometric evaluation and tympanogram today. Referred back to physician.    Hearing aid evaluation recommended post medical clearance.    Amy Vera MS, CCC-A

## 2025-06-02 NOTE — PROGRESS NOTES
Otolaryngology Consultation Note     HPI: 63 y/o M previously seen by Dr. Sneed for hearing loss, tinnitus, cerumen. Here for follow up. Recently notes increased intensity in tinnitus. No change in hearing. Underwent audiogram today. Also notes itching in both ears. No aural fullness, otorrhea or vertigo. Also notes sore throat and intermittent left neck tenderness. Has h/o thyroid nodule, underwent US in the past. No personal or family h/o thyroid disorders or thyroid cancer. No dysphagia, odynophagia or difficulty breathing. No other new complaints since last visit.      Past Medical History: Past Medical History[1]     Past Surgical History: Past Surgical History[2]     Medication: Scheduled Meds:Scheduled Medications[3]  Continuous Infusions:Medication Infusions[4]  PRN Meds:.PRN Medications[5]     Allergies:  Allergies[6]  Pertinent Family History: Family History[7]     Pertinent Social History:   Social History     Socioeconomic History    Marital status:      Spouse name: Not on file    Number of children: Not on file    Years of education: Not on file    Highest education level: Not on file   Occupational History    Not on file   Tobacco Use    Smoking status: Every Day     Current packs/day: 0.00     Types: Cigarettes     Last attempt to quit: 1975     Years since quittin.0    Smokeless tobacco: Never   Vaping Use    Vaping status: Never Used   Substance and Sexual Activity    Alcohol use: Yes     Alcohol/week: 6.0 standard drinks of alcohol     Types: 6 Shots of liquor per week    Drug use: Never    Sexual activity: Not on file   Other Topics Concern    Caffeine Concern Not Asked    Exercise Not Asked    Seat Belt Not Asked    Special Diet Not Asked    Stress Concern Not Asked    Weight Concern Not Asked   Social History Narrative    Not on file     Social Drivers of Health     Food Insecurity: Not on file   Transportation Needs: Not on file   Stress: Not on file   Housing Stability: Not on  file        Review of Systems:  Constitutional: Negative.  HENT: see above  Eyes: Negative.  Respiratory: Negative.  Cardiovascular: Negative.  Gastrointestinal: Negative.  Musculoskeletal: Negative.  Skin: Negative.  Renal: Negative  Endocrine: Negative  Psychiatric/Behavioral: Negative.     Physical Examination:  Vitals: There were no vitals taken for this visit.     General: Breathing comfortably on room air while sitting up. Able to communicate verbally. Voice normal. Normal appearing body habitus.     Musculoskeletal: Head: Atraumatic and normocephalic.     Neck: Full ROM and able to extend without issues     Ears: External auditory canals clear with no evidence of significant cerumen or stenosis. Tympanic membranes visible with no evidence of retraction or perforation. No evidence of middle ear effusion bilaterally.      Eyes: Extraocular movements intact and pupils equally reactive to light stimulus. No spontaneous or gaze-evoked nystagmus. No proptosis or ecchymosis. VFI.      Mouth/Throat: Salivary glands appear normal with no evidence of pain or mass. No masses or lesions noted within the oral mucosa, hard and soft palates, tongue, tonsils and posterior pharynx. Able to tolerate secretions. Oral cavity and oropharynx widely patent. Tonsils 1 plus and symmetric. Posterior pharyngeal walls appear normal. Thyroid non tender to palpation without evidence of mass or nodules.    Neuro: CN 7 intact with symmetric mobility and strength. No loss of facial sensation.     Skin: Dry, normal turgor, normal color.     Psych: Alert and oriented to person/place/time. Normal affect, amiable     Audiogram personally reviewed  My interpretation is as follows    Mild sloping to a moderate-severe SNHL bilaterally  Normal type A tympanograms bilaterally  % (R), 100% (L)       Assessment/Plan:     Bilateral SNHL: audiogram reviewed with patient today. Patient is medically cleared for hearing aids, follow up with audiology  to discuss further. Rpt audiogram annually.   Bilateral tinnitus: recommend masking techniques.   Aural pruritus: recommend hydrocortisone cream BID prn.   Thyroid nodules: will order US neck to further evaluate.        Jennifer Machuca MD         [1]   Past Medical History:   BPH (benign prostatic hyperplasia)    Esophageal reflux    Essential hypertension    Glaucoma    Hearing impairment    Tinnitis    High blood pressure    High cholesterol    Hyperlipidemia    Vertigo    Vertigo    Visual impairment    glasses   [2]   Past Surgical History:  Procedure Laterality Date    Colonoscopy      Colonoscopy N/A 12/20/2022    Procedure: COLONOSCOPY;  Surgeon: Anselmo Lyon MD;  Location:  ENDOSCOPY    Hand/finger surgery unlisted Left 10/23/2019    left trigger finger    Hernia surgery Bilateral 05/02/2022    Other surgical history  01/07/2021    Prostate Biopsy Dr. Loo    Upper gi endoscopy,exam     [3] [4] [5] [6] No Known Allergies  [7] No family history on file.

## 2025-08-21 ENCOUNTER — HOSPITAL ENCOUNTER (OUTPATIENT)
Dept: ULTRASOUND IMAGING | Age: 65
Discharge: HOME | End: 2025-08-21
Attending: OTOLARYNGOLOGY

## 2025-08-21 DIAGNOSIS — E04.2 MULTIPLE THYROID NODULES: ICD-10-CM

## 2025-08-21 PROCEDURE — 76536 US EXAM OF HEAD AND NECK: CPT | Performed by: OTOLARYNGOLOGY

## 2025-08-26 ENCOUNTER — OFFICE VISIT (OUTPATIENT)
Facility: LOCATION | Age: 65
End: 2025-08-26

## 2025-08-26 DIAGNOSIS — E04.2 MULTIPLE THYROID NODULES: Primary | ICD-10-CM

## 2025-08-26 DIAGNOSIS — H90.3 SENSORINEURAL HEARING LOSS (SNHL) OF BOTH EARS: ICD-10-CM

## 2025-08-26 DIAGNOSIS — H93.13 TINNITUS OF BOTH EARS: ICD-10-CM

## 2025-08-26 DIAGNOSIS — L29.9 EAR ITCHING: ICD-10-CM

## 2025-08-26 PROCEDURE — 99214 OFFICE O/P EST MOD 30 MIN: CPT | Performed by: OTOLARYNGOLOGY

## (undated) DEVICE — Device: Brand: DEFENDO AIR/WATER/SUCTION AND BIOPSY VALVE

## (undated) DEVICE — GAUZE TRAY STERILE 4X4 12PLY

## (undated) DEVICE — CYSTO CDS-LF: Brand: MEDLINE INDUSTRIES, INC.

## (undated) DEVICE — TROCAR: Brand: KII® SLEEVE

## (undated) DEVICE — ENDOSCOPY PACK - LOWER: Brand: MEDLINE INDUSTRIES, INC.

## (undated) DEVICE — UNDYED BRAIDED (POLYGLACTIN 910), SYNTHETIC ABSORBABLE SUTURE: Brand: COATED VICRYL

## (undated) DEVICE — TRAY SURESTEP 16 BARDEX UMETR

## (undated) DEVICE — STERILE POLYISOPRENE POWDER-FREE SURGICAL GLOVES: Brand: PROTEXIS

## (undated) DEVICE — ELLIK BLADDER EVACUTR DISP

## (undated) DEVICE — INSUFFLATION NEEDLE TO ESTABLISH PNEUMOPERITONEUM.: Brand: INSUFFLATION NEEDLE

## (undated) DEVICE — SLEEVE KENDALL SCD EXPRESS MED

## (undated) DEVICE — 1200CC GUARDIAN II: Brand: GUARDIAN

## (undated) DEVICE — DUAL SPIKE ADAPTER: Brand: CONMED

## (undated) DEVICE — Device

## (undated) DEVICE — TROCAR: Brand: KII SHIELDED BLADED ACCESS SYSTEM

## (undated) DEVICE — 3M™ RED DOT™ MONITORING ELECTRODE WITH FOAM TAPE AND STICKY GEL, 50/BAG, 20/CASE, 72/PLT 2570: Brand: RED DOT™

## (undated) DEVICE — SYRINGE 30ML LL TIP

## (undated) DEVICE — PAD SACRAL PREMIUM 12X12X1

## (undated) DEVICE — SKN PREP SPNG STKS PVP PNT STR: Brand: MEDLINE INDUSTRIES, INC.

## (undated) DEVICE — FILTERLINE NASAL ADULT O2/CO2

## (undated) DEVICE — MEGADYNE ELECTRODE ADULT PT RT

## (undated) DEVICE — PLUMEPORT ACTIV LAPAROSCOPIC SMOKE FILTRATION DEVICE: Brand: PLUMEPORT ACTIVE

## (undated) DEVICE — SYRINGE,TOOMEY,IRRIGATION,70CC,STERILE: Brand: MEDLINE

## (undated) DEVICE — MONOFILAMENT ABSORBABLE SUTURE: Brand: MAXON

## (undated) DEVICE — SOLUTION  .9 3000ML

## (undated) DEVICE — SOLUTION  .9 1000ML BTL

## (undated) DEVICE — SPONGE STICK WITH PVP-I: Brand: KENDALL

## (undated) DEVICE — HF-RESECTION ELECTRODE PLASMALOOP LOOP, MEDIUM, 24 FR., 12°/16°, ESG TURIS: Brand: OLYMPUS

## (undated) DEVICE — STERILE WATER 1000ML BTL

## (undated) DEVICE — EVAC URL LDSEN DF4 IBIR 64CM

## (undated) DEVICE — CAPSURE PERMANENT FIXATION SYSTEM 30 PERMANENT FASTENERS: Brand: CAPSURE PERMANENT FIXATION SYSTEM

## (undated) DEVICE — LIGHT HANDLE

## (undated) DEVICE — BAG URINE 4 LITER 600909

## (undated) DEVICE — VIOLET BRAIDED (POLYGLACTIN 910), SYNTHETIC ABSORBABLE SUTURE: Brand: COATED VICRYL

## (undated) DEVICE — GENERAL LAPAROS CDS-LF: Brand: MEDLINE INDUSTRIES, INC.

## (undated) NOTE — LETTER
22    Patient: Juancho Souza  : 6/15/1960 Visit date: 2022    Dear  Avelino Medina DO    Thank you for referring Juancho Souza to my practice. Please find my assessment and plan below. Assessment   Polyp of colon, unspecified part of colon, unspecified type  (primary encounter diagnosis)  Diverticulosis  Grade III internal hemorrhoids  Enlarged prostate on rectal examination      Plan   The patient presents for evaluation of internal hemorrhoids and her notes a colonoscopy consult. The patient has had a prior colonoscopy. I performed this patient's most recent colonoscopy on 2019. He had diverticulosis at that time. He does have a history of colon polyps. The patient denies diarrhea, constipation or alternating between the two. The patient denies having any blood, mucus or dark tarry stools. The patient denies having any narrowing of stools. The patient denies any unintentional weight loss. The patient has occasional abdominal pain. He denies abdominal distention the patient denies fevers, chills, nausea or vomiting. The patient also complains of hemorrhoids. We last saw the patient in  regarding his grade 3 internal hemorrhoids. He was recommended to undergo rubber band treatment at that time, but he never completed this treatment. The patient's hemorrhoids are primarily asymptomatic. He does complain of some perianal pruritus. He denies rectal pain or bleeding. He states his hemorrhoids prolapse with every bowel movement and require manual reduction. The patient denies any first or second-degree family history of colon cancer or colon polyps. The patient denies any first or second-degree family history of uterine cancer. The patient has no significant past medical history. He does not take any blood thinners. The patient has the patient had a bilateral inguinal hernia repair in May of this year with Dr. Amairani Urbina.   He has had no other abdominal operations     Clinical examination of the abdomen reveals it to be soft, nondistended, nontender, bowel sounds are normal activity normal pitch. There  is no rebounding tenderness or guarding. There are no signs of ascites or peritonitis. The liver and spleen are nonpalpable. There are no palpable masses or hernias. The patient has well-healed laparoscopic incision sites. Clinical exam of the rectum including anoscopy reveals no external hemorrhoids, fissures, fistulae or abscesses. Anoscopy reveals grade 3 internal hemorrhoids in the right anterior, right posterior and left lateral locations. There is no evidence of proctitis or Crohn's disease. Digital rectal exam reveals no palpable masses. He has an enlarged prostate. Basal tone 3/4 and maximum squeeze pressure 3/4. I took the opportunity at today's visit to rubberbanded his right anterior grade 3 internal hemorrhoid. The patient will be scheduled to undergo a colonoscopy. He will also set up 3 additional rubber band treatments of his hemorrhoids at today's visit. All risks, benefits, complications and alternatives to the proposed procedure(s) were fully discussed with the patient. All questions from the patient were answered in detail. A description of the procedure(s) possible outcomes was fully discussed. The patient seemed to understand the conversation and its details. Consent for the procedure(s) was confirmed with the patient.       Sincerely,       Evgeny Yarbrough MD   CC: No Recipients

## (undated) NOTE — ED AVS SNAPSHOT
Mr. Allan Street   MRN: [id number]    Department:  [facility] Emergency Department   Date of Visit:  12/27/2017           Disclosure     Insurance plans vary and the physician(s) referred by the ER may not be covered by your plan.  Please contact y tell this physician (or your personal doctor if your instructions are to return to your personal doctor) about any new or lasting problems. The primary care or specialist physician will see patients referred from the [facility] Emergency Department.  Sanjeev Mcfadden

## (undated) NOTE — LETTER
23    Patient: Tess Cazares  : 6/15/1960 Visit date: 2023    Dear  Scarlet Faith DO    Thank you for referring Tess Cazares to my practice. Please find my assessment and plan below. Assessment   Grade III internal hemorrhoids  (primary encounter diagnosis)    Plan   At today's office visit we treated right yara-lateral internal hemorrhoid. At today's visit, the patient underwent an uncomplicated application of a rubber band and injection of a 5% phenol solution into the base of the rubberbanded hemorrhoid. The patient tolerated the procedure well. The patient remained stable throughout the entire procedure within my office. The patient was asked to recover in my office for a few minutes prior to leaving for home. The patient should refrain from extreme sports or athletic activity, including heavy lifting. We will see this patient again in 2-3 weeks for further care and treatment.          Sincerely,       Alexis Ruth MD   CC: No Recipients

## (undated) NOTE — LETTER
23    Patient: Renate Washington  : 6/15/1960 Visit date: 3/21/2023    Dear  Samira Iverson DO    Thank you for referring Renate Washington to my practice. Please find my assessment and plan below. Assessment   Grade III internal hemorrhoids  (primary encounter diagnosis)  Enlarged prostate on rectal examination    Plan   At today's office visit we treated a left anterior lateral internal hemorrhoid. At today's visit, the patient underwent an uncomplicated application of a rubber band and injection of a 5% phenol solution into the base of the rubberbanded hemorrhoid. The patient tolerated the procedure well. The patient remained stable throughout the entire procedure within my office. The patient was asked to recover in my office for a few minutes prior to leaving for home. The patient should refrain from extreme sports or athletic activity, including heavy lifting. I have no further follow-up scheduled with this patient at this time. This patient can see me on an as-needed basis. This patient should return urgently for any problems or complications related to my surgical intervention.        Sincerely,       Kenyetta Pa MD   CC: No Recipients

## (undated) NOTE — ED AVS SNAPSHOT
Mr. Kemi Chou   MRN: [id number]    Department:  [facility] Emergency Department   Date of Visit:  9/8/2017           Disclosure     Insurance plans vary and the physician(s) referred by the ER may not be covered by your plan.  Please contact you If you have been prescribed any medication(s), please fill your prescription right away and begin taking the medication(s) as directed    If the emergency physician has read X-rays, these will be re-interpreted by a radiologist.  If there is a significant

## (undated) NOTE — Clinical Note
I had the pleasure of seeing Manuel Trammell on 4/20/2022. Please see my attached note.     Ashanti Lorenzo MD FACS  [facility]

## (undated) NOTE — LETTER
19    Patient: Roger Solomon  : 6/15/1960 Visit date: 2019    Dear  Dr. Mili Sampson, DO,    Thank you for referring Roger Solomon to my practice. Please find my assessment and plan below.              Assessment   Grade III internal hemorrhoids There is no rebound tenderness or guarding. There are no signs of ascites or peritonitis. Liver and spleen are nonpalpable. There are no palpable masses or hernias.     Clinical examination of the rectum including anoscopy reveals no external hemorrhoids

## (undated) NOTE — Clinical Note
I had the pleasure of seeing Agnes Orozco on 5/9/2022. Please see my attached note.     Frank Theodore MD FACS  [facility]

## (undated) NOTE — LETTER
23    Patient: Thompson Monique  : 6/15/1960 Visit date: 2023    Dear  Danny Lauren DO    Thank you for referring Thompson Monique to my practice. Please find my assessment and plan below. Assessment   Grade III internal hemorrhoids  (primary encounter diagnosis)    Plan   At today's office visit we treated a left lateral internal hemorrhoid. At today's visit, the patient underwent an uncomplicated application of a rubber band and injection of a 5% phenol solution into the base of the rubberbanded hemorrhoid. The patient tolerated the procedure well. The patient remained stable throughout the entire procedure within my office. The patient was asked to recover in my office for a few minutes prior to leaving for home. The patient should refrain from extreme sports or athletic activity, including heavy lifting. We will see this patient again in 2-3 weeks for further care and treatment.          Sincerely,       Monique Parker MD   CC: No Recipients

## (undated) NOTE — LETTER
23    Patient: Krystal Dia  : 6/15/1960 Visit date: 2023    Dear  Ankita Man DO    Thank you for referring Krystal Dia to my practice. Please find my assessment and plan below. Assessment   Grade III internal hemorrhoids  (primary encounter diagnosis)  Enlarged prostate on rectal examination    Plan   This patient presents for further care and treatment regarding his internal hemorrhoids grade 3. He had 2 prior banding sessions. After the second rubber band, he did suffer urinary retention. He needed to be straight cathed several times. The patient is known to have an enlarged prostate. His hemorrhoid symptoms greatly improved with the first rubber band. He had no further relief with the second rubber band. He is still overall better than pretreatment. At today's office visit we treated a right postero-lateral internal hemorrhoid. At today's visit, the patient underwent an uncomplicated application of a rubber band and injection of a 5% phenol solution into the base of the rubberbanded hemorrhoid. The patient tolerated the procedure well. The patient remained stable throughout the entire procedure within my office. The patient was asked to recover in my office for a few minutes prior to leaving for home. The patient should refrain from extreme sports or athletic activity, including heavy lifting. We will see this patient again in 2-3 weeks for further care and treatment. I asked the patient to straight cath himself at 8 PM if he is unable to void by that time. I did  the patient that the urinary retention is related to the rubber band treatments. The same nerve that has to relax to let the urine out caries pain back to the brain from the anus.        Sincerely,       Karel Jackson MD   CC: Shashi Schuler MD